# Patient Record
Sex: MALE | Race: WHITE | URBAN - METROPOLITAN AREA
[De-identification: names, ages, dates, MRNs, and addresses within clinical notes are randomized per-mention and may not be internally consistent; named-entity substitution may affect disease eponyms.]

---

## 2018-08-22 ENCOUNTER — APPOINTMENT (RX ONLY)
Dept: URBAN - METROPOLITAN AREA CLINIC 4 | Facility: CLINIC | Age: 83
Setting detail: DERMATOLOGY
End: 2018-08-22

## 2018-08-22 DIAGNOSIS — Z86.006 PERSONAL HISTORY OF MELANOMA IN-SITU: ICD-10-CM

## 2018-08-22 DIAGNOSIS — D22 MELANOCYTIC NEVI: ICD-10-CM

## 2018-08-22 DIAGNOSIS — L82.1 OTHER SEBORRHEIC KERATOSIS: ICD-10-CM

## 2018-08-22 DIAGNOSIS — L81.4 OTHER MELANIN HYPERPIGMENTATION: ICD-10-CM

## 2018-08-22 DIAGNOSIS — L20.89 OTHER ATOPIC DERMATITIS: ICD-10-CM

## 2018-08-22 PROBLEM — L20.84 INTRINSIC (ALLERGIC) ECZEMA: Status: ACTIVE | Noted: 2018-08-22

## 2018-08-22 PROBLEM — D22.5 MELANOCYTIC NEVI OF TRUNK: Status: ACTIVE | Noted: 2018-08-22

## 2018-08-22 PROBLEM — Z85.820 PERSONAL HISTORY OF MALIGNANT MELANOMA OF SKIN: Status: ACTIVE | Noted: 2018-08-22

## 2018-08-22 PROCEDURE — 99213 OFFICE O/P EST LOW 20 MIN: CPT

## 2018-08-22 PROCEDURE — ? COUNSELING

## 2018-08-22 PROCEDURE — ? OBSERVATION

## 2018-08-22 PROCEDURE — ? PRESCRIPTION

## 2018-08-22 RX ORDER — FLUOCINONIDE 0.5 MG/G
CREAM TOPICAL
Qty: 1 | Refills: 3 | Status: CANCELLED
Stop reason: CLARIF

## 2018-08-22 RX ORDER — FLUOCINONIDE 0.5 MG/ML
OINTMENT TOPICAL
Qty: 1 | Refills: 5 | Status: ERX

## 2018-08-22 ASSESSMENT — LOCATION SIMPLE DESCRIPTION DERM
LOCATION SIMPLE: RIGHT UPPER BACK
LOCATION SIMPLE: RIGHT FOREARM
LOCATION SIMPLE: CHEST
LOCATION SIMPLE: LEFT UPPER BACK

## 2018-08-22 ASSESSMENT — LOCATION DETAILED DESCRIPTION DERM
LOCATION DETAILED: LEFT MID-UPPER BACK
LOCATION DETAILED: RIGHT LATERAL SUPERIOR CHEST
LOCATION DETAILED: RIGHT PROXIMAL DORSAL FOREARM
LOCATION DETAILED: LEFT INFERIOR UPPER BACK
LOCATION DETAILED: RIGHT INFERIOR UPPER BACK

## 2018-08-22 ASSESSMENT — LOCATION ZONE DERM
LOCATION ZONE: TRUNK
LOCATION ZONE: ARM

## 2019-03-28 ENCOUNTER — HOSPITAL ENCOUNTER (INPATIENT)
Dept: HOSPITAL 8 - ED | Age: 84
LOS: 6 days | Discharge: HOME | DRG: 640 | End: 2019-04-03
Attending: HOSPITALIST | Admitting: HOSPITALIST
Payer: MEDICARE

## 2019-03-28 VITALS — SYSTOLIC BLOOD PRESSURE: 128 MMHG | DIASTOLIC BLOOD PRESSURE: 86 MMHG

## 2019-03-28 VITALS — BODY MASS INDEX: 31.57 KG/M2 | WEIGHT: 196.43 LBS | HEIGHT: 66 IN

## 2019-03-28 DIAGNOSIS — E87.70: ICD-10-CM

## 2019-03-28 DIAGNOSIS — Z87.891: ICD-10-CM

## 2019-03-28 DIAGNOSIS — Z95.5: ICD-10-CM

## 2019-03-28 DIAGNOSIS — J90: ICD-10-CM

## 2019-03-28 DIAGNOSIS — K21.9: ICD-10-CM

## 2019-03-28 DIAGNOSIS — Z82.49: ICD-10-CM

## 2019-03-28 DIAGNOSIS — M25.78: ICD-10-CM

## 2019-03-28 DIAGNOSIS — E74.39: ICD-10-CM

## 2019-03-28 DIAGNOSIS — I11.9: ICD-10-CM

## 2019-03-28 DIAGNOSIS — E78.5: ICD-10-CM

## 2019-03-28 DIAGNOSIS — H40.9: ICD-10-CM

## 2019-03-28 DIAGNOSIS — J18.9: ICD-10-CM

## 2019-03-28 DIAGNOSIS — I08.0: ICD-10-CM

## 2019-03-28 DIAGNOSIS — E55.9: ICD-10-CM

## 2019-03-28 DIAGNOSIS — I25.10: ICD-10-CM

## 2019-03-28 DIAGNOSIS — H35.30: ICD-10-CM

## 2019-03-28 DIAGNOSIS — E87.1: Primary | ICD-10-CM

## 2019-03-28 DIAGNOSIS — N40.0: ICD-10-CM

## 2019-03-28 LAB
ALBUMIN SERPL-MCNC: 3.4 G/DL (ref 3.4–5)
ALP SERPL-CCNC: 73 U/L (ref 45–117)
ALT SERPL-CCNC: 33 U/L (ref 12–78)
ANION GAP SERPL CALC-SCNC: 9 MMOL/L (ref 5–15)
BASOPHILS # BLD AUTO: 0 X10^3/UL (ref 0–0.1)
BASOPHILS NFR BLD AUTO: 0 % (ref 0–1)
BILIRUB SERPL-MCNC: 0.6 MG/DL (ref 0.2–1)
CALCIUM SERPL-MCNC: 8.7 MG/DL (ref 8.5–10.1)
CHLORIDE SERPL-SCNC: 94 MMOL/L (ref 98–107)
CREAT SERPL-MCNC: 0.94 MG/DL (ref 0.7–1.3)
CULTURE INDICATED?: NO
EOSINOPHIL # BLD AUTO: 0.03 X10^3/UL (ref 0–0.4)
EOSINOPHIL NFR BLD AUTO: 0 % (ref 1–7)
ERYTHROCYTE [DISTWIDTH] IN BLOOD BY AUTOMATED COUNT: 12.8 % (ref 9.4–14.8)
LYMPHOCYTES # BLD AUTO: 1.08 X10^3/UL (ref 1–3.4)
LYMPHOCYTES NFR BLD AUTO: 13 % (ref 22–44)
MCH RBC QN AUTO: 32.9 PG (ref 27.5–34.5)
MCHC RBC AUTO-ENTMCNC: 34.1 G/DL (ref 33.2–36.2)
MCV RBC AUTO: 96.5 FL (ref 81–97)
MD: NO
MICROSCOPIC: (no result)
MONOCYTES # BLD AUTO: 0.62 X10^3/UL (ref 0.2–0.8)
MONOCYTES NFR BLD AUTO: 7 % (ref 2–9)
NEUTROPHILS # BLD AUTO: 6.6 X10^3/UL (ref 1.8–6.8)
NEUTROPHILS NFR BLD AUTO: 79 % (ref 42–75)
PLATELET # BLD AUTO: 138 X10^3/UL (ref 130–400)
PMV BLD AUTO: 9.4 FL (ref 7.4–10.4)
PROT SERPL-MCNC: 6.7 G/DL (ref 6.4–8.2)
RBC # BLD AUTO: 3.57 X10^6/UL (ref 4.38–5.82)
TSH SERPL-ACNC: 7.23 MIU/L (ref 0.36–3.74)

## 2019-03-28 PROCEDURE — 82040 ASSAY OF SERUM ALBUMIN: CPT

## 2019-03-28 PROCEDURE — 99285 EMERGENCY DEPT VISIT HI MDM: CPT

## 2019-03-28 PROCEDURE — 83935 ASSAY OF URINE OSMOLALITY: CPT

## 2019-03-28 PROCEDURE — 82436 ASSAY OF URINE CHLORIDE: CPT

## 2019-03-28 PROCEDURE — 84145 PROCALCITONIN (PCT): CPT

## 2019-03-28 PROCEDURE — 83735 ASSAY OF MAGNESIUM: CPT

## 2019-03-28 PROCEDURE — 85025 COMPLETE CBC W/AUTO DIFF WBC: CPT

## 2019-03-28 PROCEDURE — 83930 ASSAY OF BLOOD OSMOLALITY: CPT

## 2019-03-28 PROCEDURE — 96365 THER/PROPH/DIAG IV INF INIT: CPT

## 2019-03-28 PROCEDURE — 80053 COMPREHEN METABOLIC PANEL: CPT

## 2019-03-28 PROCEDURE — 84133 ASSAY OF URINE POTASSIUM: CPT

## 2019-03-28 PROCEDURE — 71046 X-RAY EXAM CHEST 2 VIEWS: CPT

## 2019-03-28 PROCEDURE — 81003 URINALYSIS AUTO W/O SCOPE: CPT

## 2019-03-28 PROCEDURE — 93306 TTE W/DOPPLER COMPLETE: CPT

## 2019-03-28 PROCEDURE — 96375 TX/PRO/DX INJ NEW DRUG ADDON: CPT

## 2019-03-28 PROCEDURE — 80048 BASIC METABOLIC PNL TOTAL CA: CPT

## 2019-03-28 PROCEDURE — 83880 ASSAY OF NATRIURETIC PEPTIDE: CPT

## 2019-03-28 PROCEDURE — 71250 CT THORAX DX C-: CPT

## 2019-03-28 PROCEDURE — 87205 SMEAR GRAM STAIN: CPT

## 2019-03-28 PROCEDURE — 87040 BLOOD CULTURE FOR BACTERIA: CPT

## 2019-03-28 PROCEDURE — 83605 ASSAY OF LACTIC ACID: CPT

## 2019-03-28 PROCEDURE — 36415 COLL VENOUS BLD VENIPUNCTURE: CPT

## 2019-03-28 PROCEDURE — 94640 AIRWAY INHALATION TREATMENT: CPT

## 2019-03-28 PROCEDURE — 93005 ELECTROCARDIOGRAM TRACING: CPT

## 2019-03-28 PROCEDURE — 87070 CULTURE OTHR SPECIMN AEROBIC: CPT

## 2019-03-28 PROCEDURE — 84300 ASSAY OF URINE SODIUM: CPT

## 2019-03-28 PROCEDURE — 84443 ASSAY THYROID STIM HORMONE: CPT

## 2019-03-28 RX ADMIN — LISINOPRIL SCH MG: 20 TABLET ORAL at 20:31

## 2019-03-28 RX ADMIN — LATANOPROST SCH DROP: 50 SOLUTION OPHTHALMIC at 21:51

## 2019-03-28 RX ADMIN — SIMVASTATIN SCH MG: 40 TABLET, FILM COATED ORAL at 20:31

## 2019-03-28 RX ADMIN — GUAIFENESIN SCH MG: 200 TABLET ORAL at 20:31

## 2019-03-28 RX ADMIN — CEFTRIAXONE SCH MLS/HR: 1 INJECTION, SOLUTION INTRAVENOUS at 16:30

## 2019-03-28 RX ADMIN — DOXYCYCLINE SCH MLS/HR: 100 INJECTION, POWDER, LYOPHILIZED, FOR SOLUTION INTRAVENOUS at 18:31

## 2019-03-28 RX ADMIN — SODIUM CHLORIDE SCH MLS/HR: 0.9 INJECTION, SOLUTION INTRAVENOUS at 17:21

## 2019-03-28 RX ADMIN — TIMOLOL MALEATE SCH DROP: 5 SOLUTION OPHTHALMIC at 21:00

## 2019-03-28 RX ADMIN — ENOXAPARIN SODIUM SCH MG: 40 INJECTION SUBCUTANEOUS at 17:21

## 2019-03-28 RX ADMIN — GUAIFENESIN SCH MG: 200 TABLET ORAL at 17:20

## 2019-03-28 RX ADMIN — ASPIRIN SCH MG: 81 TABLET, COATED ORAL at 20:31

## 2019-03-28 RX ADMIN — Medication SCH SPRAY: at 21:52

## 2019-03-28 RX ADMIN — DOXAZOSIN SCH MG: 2 TABLET ORAL at 20:31

## 2019-03-29 VITALS — DIASTOLIC BLOOD PRESSURE: 80 MMHG | SYSTOLIC BLOOD PRESSURE: 177 MMHG

## 2019-03-29 VITALS — SYSTOLIC BLOOD PRESSURE: 181 MMHG | DIASTOLIC BLOOD PRESSURE: 80 MMHG

## 2019-03-29 VITALS — DIASTOLIC BLOOD PRESSURE: 99 MMHG | SYSTOLIC BLOOD PRESSURE: 193 MMHG

## 2019-03-29 VITALS — DIASTOLIC BLOOD PRESSURE: 84 MMHG | SYSTOLIC BLOOD PRESSURE: 179 MMHG

## 2019-03-29 LAB
ANION GAP SERPL CALC-SCNC: 9 MMOL/L (ref 5–15)
BASOPHILS # BLD AUTO: 0.01 X10^3/UL (ref 0–0.1)
BASOPHILS NFR BLD AUTO: 0 % (ref 0–1)
CALCIUM SERPL-MCNC: 8.3 MG/DL (ref 8.5–10.1)
CHLORIDE SERPL-SCNC: 93 MMOL/L (ref 98–107)
CREAT SERPL-MCNC: 0.86 MG/DL (ref 0.7–1.3)
EOSINOPHIL # BLD AUTO: 0.02 X10^3/UL (ref 0–0.4)
EOSINOPHIL NFR BLD AUTO: 0 % (ref 1–7)
ERYTHROCYTE [DISTWIDTH] IN BLOOD BY AUTOMATED COUNT: 12.6 % (ref 9.4–14.8)
LYMPHOCYTES # BLD AUTO: 0.97 X10^3/UL (ref 1–3.4)
LYMPHOCYTES NFR BLD AUTO: 14 % (ref 22–44)
MCH RBC QN AUTO: 33.2 PG (ref 27.5–34.5)
MCHC RBC AUTO-ENTMCNC: 34.5 G/DL (ref 33.2–36.2)
MCV RBC AUTO: 96.2 FL (ref 81–97)
MD: NO
MONOCYTES # BLD AUTO: 0.56 X10^3/UL (ref 0.2–0.8)
MONOCYTES NFR BLD AUTO: 8 % (ref 2–9)
NEUTROPHILS # BLD AUTO: 5.59 X10^3/UL (ref 1.8–6.8)
NEUTROPHILS NFR BLD AUTO: 78 % (ref 42–75)
PLATELET # BLD AUTO: 132 X10^3/UL (ref 130–400)
PMV BLD AUTO: 10.8 FL (ref 7.4–10.4)
RBC # BLD AUTO: 3.26 X10^6/UL (ref 4.38–5.82)

## 2019-03-29 RX ADMIN — OMEPRAZOLE SCH MG: 20 CAPSULE, DELAYED RELEASE ORAL at 08:16

## 2019-03-29 RX ADMIN — SIMVASTATIN SCH MG: 40 TABLET, FILM COATED ORAL at 21:16

## 2019-03-29 RX ADMIN — TIMOLOL MALEATE SCH DROP: 5 SOLUTION OPHTHALMIC at 09:00

## 2019-03-29 RX ADMIN — DOXAZOSIN SCH MG: 2 TABLET ORAL at 21:15

## 2019-03-29 RX ADMIN — DOXYCYCLINE SCH MLS/HR: 100 INJECTION, POWDER, LYOPHILIZED, FOR SOLUTION INTRAVENOUS at 05:53

## 2019-03-29 RX ADMIN — LATANOPROST SCH DROP: 50 SOLUTION OPHTHALMIC at 21:17

## 2019-03-29 RX ADMIN — DOXYCYCLINE SCH MLS/HR: 100 INJECTION, POWDER, LYOPHILIZED, FOR SOLUTION INTRAVENOUS at 19:08

## 2019-03-29 RX ADMIN — SODIUM CHLORIDE SCH MLS/HR: 0.9 INJECTION, SOLUTION INTRAVENOUS at 08:22

## 2019-03-29 RX ADMIN — GUAIFENESIN SCH MG: 200 TABLET ORAL at 05:53

## 2019-03-29 RX ADMIN — TIMOLOL MALEATE SCH DROP: 5 SOLUTION OPHTHALMIC at 21:00

## 2019-03-29 RX ADMIN — DOXAZOSIN MESYLATE SCH MG: 1 TABLET ORAL at 08:16

## 2019-03-29 RX ADMIN — Medication SCH CAP: at 08:16

## 2019-03-29 RX ADMIN — DILTIAZEM HYDROCHLORIDE SCH MG: 120 CAPSULE, EXTENDED RELEASE ORAL at 08:16

## 2019-03-29 RX ADMIN — Medication SCH TAB: at 08:16

## 2019-03-29 RX ADMIN — ASPIRIN SCH MG: 81 TABLET, COATED ORAL at 21:17

## 2019-03-29 RX ADMIN — LISINOPRIL SCH MG: 20 TABLET ORAL at 21:16

## 2019-03-29 RX ADMIN — Medication SCH SPRAY: at 21:17

## 2019-03-29 RX ADMIN — ENOXAPARIN SODIUM SCH MG: 40 INJECTION SUBCUTANEOUS at 17:16

## 2019-03-29 RX ADMIN — Medication SCH SPRAY: at 08:16

## 2019-03-29 RX ADMIN — CEFTRIAXONE SCH MLS/HR: 1 INJECTION, SOLUTION INTRAVENOUS at 18:10

## 2019-03-29 RX ADMIN — GUAIFENESIN SCH MG: 200 TABLET ORAL at 11:53

## 2019-03-29 RX ADMIN — GUAIFENESIN SCH MG: 600 TABLET, EXTENDED RELEASE ORAL at 21:16

## 2019-03-29 RX ADMIN — METOPROLOL SUCCINATE SCH MG: 50 TABLET, FILM COATED, EXTENDED RELEASE ORAL at 08:16

## 2019-03-30 VITALS — SYSTOLIC BLOOD PRESSURE: 130 MMHG | DIASTOLIC BLOOD PRESSURE: 75 MMHG

## 2019-03-30 VITALS — SYSTOLIC BLOOD PRESSURE: 138 MMHG | DIASTOLIC BLOOD PRESSURE: 88 MMHG

## 2019-03-30 VITALS — SYSTOLIC BLOOD PRESSURE: 143 MMHG | DIASTOLIC BLOOD PRESSURE: 90 MMHG

## 2019-03-30 VITALS — SYSTOLIC BLOOD PRESSURE: 169 MMHG | DIASTOLIC BLOOD PRESSURE: 89 MMHG

## 2019-03-30 LAB
ANION GAP SERPL CALC-SCNC: 11 MMOL/L (ref 5–15)
BASOPHILS # BLD AUTO: 0.02 X10^3/UL (ref 0–0.1)
BASOPHILS NFR BLD AUTO: 0 % (ref 0–1)
CALCIUM SERPL-MCNC: 8.3 MG/DL (ref 8.5–10.1)
CHLORIDE SERPL-SCNC: 86 MMOL/L (ref 98–107)
CHLORIDE,URINE RANDOM: 120 MMOL/L
CREAT SERPL-MCNC: 0.84 MG/DL (ref 0.7–1.3)
EOSINOPHIL # BLD AUTO: 0 X10^3/UL (ref 0–0.4)
EOSINOPHIL NFR BLD AUTO: 0 % (ref 1–7)
ERYTHROCYTE [DISTWIDTH] IN BLOOD BY AUTOMATED COUNT: 12.7 % (ref 9.4–14.8)
LYMPHOCYTES # BLD AUTO: 1.01 X10^3/UL (ref 1–3.4)
LYMPHOCYTES NFR BLD AUTO: 10 % (ref 22–44)
MCH RBC QN AUTO: 32.3 PG (ref 27.5–34.5)
MCHC RBC AUTO-ENTMCNC: 34 G/DL (ref 33.2–36.2)
MCV RBC AUTO: 95.1 FL (ref 81–97)
MD: NO
MONOCYTES # BLD AUTO: 0.68 X10^3/UL (ref 0.2–0.8)
MONOCYTES NFR BLD AUTO: 7 % (ref 2–9)
NEUTROPHILS # BLD AUTO: 7.98 X10^3/UL (ref 1.8–6.8)
NEUTROPHILS NFR BLD AUTO: 82 % (ref 42–75)
OSMOLALITY,URINE: 356 MOSM/KG (ref 500–850)
PLATELET # BLD AUTO: 159 X10^3/UL (ref 130–400)
PMV BLD AUTO: 9 FL (ref 7.4–10.4)
POTASSIUM UR-SCNC: 53 MMOL/L
RBC # BLD AUTO: 3.45 X10^6/UL (ref 4.38–5.82)
SODIUM,URINE RANDOM: 59 MMOL/L

## 2019-03-30 RX ADMIN — ENOXAPARIN SODIUM SCH MG: 40 INJECTION SUBCUTANEOUS at 17:04

## 2019-03-30 RX ADMIN — SIMVASTATIN SCH MG: 40 TABLET, FILM COATED ORAL at 20:47

## 2019-03-30 RX ADMIN — Medication SCH CAP: at 10:00

## 2019-03-30 RX ADMIN — DOXYCYCLINE SCH MLS/HR: 100 INJECTION, POWDER, LYOPHILIZED, FOR SOLUTION INTRAVENOUS at 06:12

## 2019-03-30 RX ADMIN — Medication SCH SPRAY: at 10:01

## 2019-03-30 RX ADMIN — SODIUM CHLORIDE SCH MLS/HR: 0.9 INJECTION, SOLUTION INTRAVENOUS at 11:20

## 2019-03-30 RX ADMIN — Medication SCH TAB: at 10:00

## 2019-03-30 RX ADMIN — TIMOLOL MALEATE SCH DROP: 5 SOLUTION OPHTHALMIC at 19:11

## 2019-03-30 RX ADMIN — TIMOLOL MALEATE SCH DROP: 5 SOLUTION OPHTHALMIC at 09:00

## 2019-03-30 RX ADMIN — Medication SCH SPRAY: at 20:46

## 2019-03-30 RX ADMIN — GUAIFENESIN SCH MG: 600 TABLET, EXTENDED RELEASE ORAL at 20:47

## 2019-03-30 RX ADMIN — DOXAZOSIN SCH MG: 2 TABLET ORAL at 20:47

## 2019-03-30 RX ADMIN — LATANOPROST SCH DROP: 50 SOLUTION OPHTHALMIC at 19:11

## 2019-03-30 RX ADMIN — ASPIRIN SCH MG: 81 TABLET, COATED ORAL at 20:47

## 2019-03-30 RX ADMIN — SODIUM CHLORIDE SCH MLS/HR: 0.9 INJECTION, SOLUTION INTRAVENOUS at 20:46

## 2019-03-30 RX ADMIN — DOXAZOSIN MESYLATE SCH MG: 1 TABLET ORAL at 10:00

## 2019-03-30 RX ADMIN — METOPROLOL SUCCINATE SCH MG: 50 TABLET, FILM COATED, EXTENDED RELEASE ORAL at 10:00

## 2019-03-30 RX ADMIN — DOXYCYCLINE SCH MLS/HR: 100 INJECTION, POWDER, LYOPHILIZED, FOR SOLUTION INTRAVENOUS at 17:37

## 2019-03-30 RX ADMIN — OMEPRAZOLE SCH MG: 20 CAPSULE, DELAYED RELEASE ORAL at 10:00

## 2019-03-30 RX ADMIN — GUAIFENESIN SCH MG: 600 TABLET, EXTENDED RELEASE ORAL at 10:01

## 2019-03-30 RX ADMIN — DILTIAZEM HYDROCHLORIDE SCH MG: 120 CAPSULE, EXTENDED RELEASE ORAL at 10:01

## 2019-03-30 RX ADMIN — DIPHENHYDRAMINE HYDROCHLORIDE PRN MG: 25 CAPSULE ORAL at 00:35

## 2019-03-30 RX ADMIN — CEFTRIAXONE SCH MLS/HR: 1 INJECTION, SOLUTION INTRAVENOUS at 17:04

## 2019-03-30 RX ADMIN — LISINOPRIL SCH MG: 20 TABLET ORAL at 20:47

## 2019-03-31 VITALS — DIASTOLIC BLOOD PRESSURE: 77 MMHG | SYSTOLIC BLOOD PRESSURE: 131 MMHG

## 2019-03-31 VITALS — SYSTOLIC BLOOD PRESSURE: 122 MMHG | DIASTOLIC BLOOD PRESSURE: 78 MMHG

## 2019-03-31 VITALS — DIASTOLIC BLOOD PRESSURE: 73 MMHG | SYSTOLIC BLOOD PRESSURE: 146 MMHG

## 2019-03-31 VITALS — SYSTOLIC BLOOD PRESSURE: 148 MMHG | DIASTOLIC BLOOD PRESSURE: 64 MMHG

## 2019-03-31 LAB
ALBUMIN SERPL-MCNC: 2.7 G/DL (ref 3.4–5)
ANION GAP SERPL CALC-SCNC: 8 MMOL/L (ref 5–15)
CALCIUM SERPL-MCNC: 7.8 MG/DL (ref 8.5–10.1)
CHLORIDE SERPL-SCNC: 85 MMOL/L (ref 98–107)
CREAT SERPL-MCNC: 0.75 MG/DL (ref 0.7–1.3)

## 2019-03-31 RX ADMIN — METOPROLOL SUCCINATE SCH MG: 50 TABLET, FILM COATED, EXTENDED RELEASE ORAL at 09:00

## 2019-03-31 RX ADMIN — Medication SCH SPRAY: at 20:30

## 2019-03-31 RX ADMIN — GUAIFENESIN SCH MG: 600 TABLET, EXTENDED RELEASE ORAL at 20:30

## 2019-03-31 RX ADMIN — OMEPRAZOLE SCH MG: 20 CAPSULE, DELAYED RELEASE ORAL at 09:00

## 2019-03-31 RX ADMIN — ENOXAPARIN SODIUM SCH MG: 40 INJECTION SUBCUTANEOUS at 16:42

## 2019-03-31 RX ADMIN — Medication SCH TAB: at 09:00

## 2019-03-31 RX ADMIN — ASPIRIN SCH MG: 81 TABLET, COATED ORAL at 20:31

## 2019-03-31 RX ADMIN — GUAIFENESIN SCH MG: 600 TABLET, EXTENDED RELEASE ORAL at 09:00

## 2019-03-31 RX ADMIN — TIMOLOL MALEATE SCH DROP: 5 SOLUTION OPHTHALMIC at 09:00

## 2019-03-31 RX ADMIN — TIMOLOL MALEATE SCH DROP: 5 SOLUTION OPHTHALMIC at 19:58

## 2019-03-31 RX ADMIN — SIMVASTATIN SCH MG: 40 TABLET, FILM COATED ORAL at 20:30

## 2019-03-31 RX ADMIN — POTASSIUM CHLORIDE SCH MEQ: 20 TABLET, EXTENDED RELEASE ORAL at 16:42

## 2019-03-31 RX ADMIN — Medication SCH CAP: at 09:00

## 2019-03-31 RX ADMIN — DOXYCYCLINE SCH MLS/HR: 100 INJECTION, POWDER, LYOPHILIZED, FOR SOLUTION INTRAVENOUS at 06:07

## 2019-03-31 RX ADMIN — Medication SCH SPRAY: at 09:00

## 2019-03-31 RX ADMIN — DOXAZOSIN SCH MG: 2 TABLET ORAL at 20:30

## 2019-03-31 RX ADMIN — LISINOPRIL SCH MG: 20 TABLET ORAL at 20:30

## 2019-03-31 RX ADMIN — DOXAZOSIN MESYLATE SCH MG: 1 TABLET ORAL at 09:00

## 2019-03-31 RX ADMIN — LATANOPROST SCH DROP: 50 SOLUTION OPHTHALMIC at 20:29

## 2019-03-31 RX ADMIN — DILTIAZEM HYDROCHLORIDE SCH MG: 120 CAPSULE, EXTENDED RELEASE ORAL at 09:00

## 2019-03-31 RX ADMIN — CEFTRIAXONE SCH MLS/HR: 1 INJECTION, SOLUTION INTRAVENOUS at 16:42

## 2019-03-31 RX ADMIN — SODIUM CHLORIDE SCH MLS/HR: 0.9 INJECTION, SOLUTION INTRAVENOUS at 08:00

## 2019-04-01 VITALS — DIASTOLIC BLOOD PRESSURE: 71 MMHG | SYSTOLIC BLOOD PRESSURE: 128 MMHG

## 2019-04-01 VITALS — DIASTOLIC BLOOD PRESSURE: 77 MMHG | SYSTOLIC BLOOD PRESSURE: 159 MMHG

## 2019-04-01 VITALS — DIASTOLIC BLOOD PRESSURE: 85 MMHG | SYSTOLIC BLOOD PRESSURE: 165 MMHG

## 2019-04-01 VITALS — DIASTOLIC BLOOD PRESSURE: 58 MMHG | SYSTOLIC BLOOD PRESSURE: 136 MMHG

## 2019-04-01 LAB
ANION GAP SERPL CALC-SCNC: 10 MMOL/L (ref 5–15)
ANION GAP SERPL CALC-SCNC: 8 MMOL/L (ref 5–15)
CALCIUM SERPL-MCNC: 8 MG/DL (ref 8.5–10.1)
CALCIUM SERPL-MCNC: 8.2 MG/DL (ref 8.5–10.1)
CHLORIDE SERPL-SCNC: 86 MMOL/L (ref 98–107)
CHLORIDE SERPL-SCNC: 86 MMOL/L (ref 98–107)
CREAT SERPL-MCNC: 0.73 MG/DL (ref 0.7–1.3)
CREAT SERPL-MCNC: 0.87 MG/DL (ref 0.7–1.3)

## 2019-04-01 RX ADMIN — CEFTRIAXONE SCH MLS/HR: 1 INJECTION, SOLUTION INTRAVENOUS at 16:50

## 2019-04-01 RX ADMIN — LATANOPROST SCH DROP: 50 SOLUTION OPHTHALMIC at 21:35

## 2019-04-01 RX ADMIN — LISINOPRIL SCH MG: 20 TABLET ORAL at 21:36

## 2019-04-01 RX ADMIN — Medication SCH SPRAY: at 08:50

## 2019-04-01 RX ADMIN — Medication SCH TAB: at 08:51

## 2019-04-01 RX ADMIN — ASPIRIN SCH MG: 81 TABLET, COATED ORAL at 21:35

## 2019-04-01 RX ADMIN — GUAIFENESIN SCH MG: 600 TABLET, EXTENDED RELEASE ORAL at 08:48

## 2019-04-01 RX ADMIN — DILTIAZEM HYDROCHLORIDE SCH MG: 120 CAPSULE, EXTENDED RELEASE ORAL at 08:50

## 2019-04-01 RX ADMIN — DIPHENHYDRAMINE HYDROCHLORIDE PRN MG: 25 CAPSULE ORAL at 01:11

## 2019-04-01 RX ADMIN — ENOXAPARIN SODIUM SCH MG: 40 INJECTION SUBCUTANEOUS at 16:50

## 2019-04-01 RX ADMIN — TIMOLOL MALEATE SCH DROP: 5 SOLUTION OPHTHALMIC at 19:43

## 2019-04-01 RX ADMIN — FUROSEMIDE SCH MG: 10 INJECTION, SOLUTION INTRAMUSCULAR; INTRAVENOUS at 11:24

## 2019-04-01 RX ADMIN — Medication SCH CAP: at 08:48

## 2019-04-01 RX ADMIN — TIMOLOL MALEATE SCH DROP: 5 SOLUTION OPHTHALMIC at 08:52

## 2019-04-01 RX ADMIN — POTASSIUM CHLORIDE SCH MEQ: 20 TABLET, EXTENDED RELEASE ORAL at 16:50

## 2019-04-01 RX ADMIN — OMEPRAZOLE SCH MG: 20 CAPSULE, DELAYED RELEASE ORAL at 08:48

## 2019-04-01 RX ADMIN — METOPROLOL SUCCINATE SCH MG: 50 TABLET, FILM COATED, EXTENDED RELEASE ORAL at 08:51

## 2019-04-01 RX ADMIN — SIMVASTATIN SCH MG: 40 TABLET, FILM COATED ORAL at 21:36

## 2019-04-01 RX ADMIN — DOXAZOSIN MESYLATE SCH MG: 1 TABLET ORAL at 08:45

## 2019-04-01 RX ADMIN — Medication SCH SPRAY: at 21:35

## 2019-04-01 RX ADMIN — GUAIFENESIN SCH MG: 600 TABLET, EXTENDED RELEASE ORAL at 21:36

## 2019-04-01 RX ADMIN — DOXAZOSIN SCH MG: 2 TABLET ORAL at 21:36

## 2019-04-01 RX ADMIN — POTASSIUM CHLORIDE SCH MEQ: 20 TABLET, EXTENDED RELEASE ORAL at 08:49

## 2019-04-02 VITALS — DIASTOLIC BLOOD PRESSURE: 85 MMHG | SYSTOLIC BLOOD PRESSURE: 165 MMHG

## 2019-04-02 VITALS — DIASTOLIC BLOOD PRESSURE: 63 MMHG | SYSTOLIC BLOOD PRESSURE: 156 MMHG

## 2019-04-02 VITALS — SYSTOLIC BLOOD PRESSURE: 127 MMHG | DIASTOLIC BLOOD PRESSURE: 63 MMHG

## 2019-04-02 VITALS — SYSTOLIC BLOOD PRESSURE: 162 MMHG | DIASTOLIC BLOOD PRESSURE: 81 MMHG

## 2019-04-02 VITALS — DIASTOLIC BLOOD PRESSURE: 80 MMHG | SYSTOLIC BLOOD PRESSURE: 189 MMHG

## 2019-04-02 LAB
ALBUMIN SERPL-MCNC: 3.1 G/DL (ref 3.4–5)
ANION GAP SERPL CALC-SCNC: 11 MMOL/L (ref 5–15)
CALCIUM SERPL-MCNC: 8.6 MG/DL (ref 8.5–10.1)
CHLORIDE SERPL-SCNC: 87 MMOL/L (ref 98–107)
CREAT SERPL-MCNC: 0.73 MG/DL (ref 0.7–1.3)

## 2019-04-02 RX ADMIN — Medication SCH SPRAY: at 20:06

## 2019-04-02 RX ADMIN — Medication SCH SPRAY: at 08:21

## 2019-04-02 RX ADMIN — TIMOLOL MALEATE SCH DROP: 5 SOLUTION OPHTHALMIC at 08:20

## 2019-04-02 RX ADMIN — OMEPRAZOLE SCH MG: 20 CAPSULE, DELAYED RELEASE ORAL at 08:18

## 2019-04-02 RX ADMIN — TIMOLOL MALEATE SCH DROP: 5 SOLUTION OPHTHALMIC at 20:07

## 2019-04-02 RX ADMIN — SIMVASTATIN SCH MG: 40 TABLET, FILM COATED ORAL at 20:06

## 2019-04-02 RX ADMIN — ENOXAPARIN SODIUM SCH MG: 40 INJECTION SUBCUTANEOUS at 17:52

## 2019-04-02 RX ADMIN — LISINOPRIL SCH MG: 20 TABLET ORAL at 20:06

## 2019-04-02 RX ADMIN — DOXAZOSIN MESYLATE SCH MG: 1 TABLET ORAL at 08:19

## 2019-04-02 RX ADMIN — DOXAZOSIN SCH MG: 2 TABLET ORAL at 20:07

## 2019-04-02 RX ADMIN — METOPROLOL SUCCINATE SCH MG: 50 TABLET, FILM COATED, EXTENDED RELEASE ORAL at 08:19

## 2019-04-02 RX ADMIN — LATANOPROST SCH DROP: 50 SOLUTION OPHTHALMIC at 20:06

## 2019-04-02 RX ADMIN — Medication SCH CAP: at 08:18

## 2019-04-02 RX ADMIN — GUAIFENESIN SCH MG: 600 TABLET, EXTENDED RELEASE ORAL at 20:07

## 2019-04-02 RX ADMIN — POTASSIUM CHLORIDE SCH MEQ: 20 TABLET, EXTENDED RELEASE ORAL at 17:52

## 2019-04-02 RX ADMIN — CEFTRIAXONE SCH MLS/HR: 1 INJECTION, SOLUTION INTRAVENOUS at 16:21

## 2019-04-02 RX ADMIN — ASPIRIN SCH MG: 81 TABLET, COATED ORAL at 20:07

## 2019-04-02 RX ADMIN — DILTIAZEM HYDROCHLORIDE SCH MG: 120 CAPSULE, EXTENDED RELEASE ORAL at 08:18

## 2019-04-02 RX ADMIN — GUAIFENESIN SCH MG: 600 TABLET, EXTENDED RELEASE ORAL at 08:32

## 2019-04-02 RX ADMIN — FUROSEMIDE SCH MG: 10 INJECTION, SOLUTION INTRAMUSCULAR; INTRAVENOUS at 08:20

## 2019-04-02 RX ADMIN — Medication SCH TAB: at 08:18

## 2019-04-02 RX ADMIN — POTASSIUM CHLORIDE SCH MEQ: 20 TABLET, EXTENDED RELEASE ORAL at 08:19

## 2019-04-03 VITALS — DIASTOLIC BLOOD PRESSURE: 66 MMHG | SYSTOLIC BLOOD PRESSURE: 152 MMHG

## 2019-04-03 VITALS — SYSTOLIC BLOOD PRESSURE: 147 MMHG | DIASTOLIC BLOOD PRESSURE: 68 MMHG

## 2019-04-03 LAB
ANION GAP SERPL CALC-SCNC: 10 MMOL/L (ref 5–15)
BASOPHILS # BLD AUTO: 0.03 X10^3/UL (ref 0–0.1)
BASOPHILS NFR BLD AUTO: 0 % (ref 0–1)
CALCIUM SERPL-MCNC: 8.3 MG/DL (ref 8.5–10.1)
CHLORIDE SERPL-SCNC: 91 MMOL/L (ref 98–107)
CREAT SERPL-MCNC: 0.8 MG/DL (ref 0.7–1.3)
EOSINOPHIL # BLD AUTO: 0.03 X10^3/UL (ref 0–0.4)
EOSINOPHIL NFR BLD AUTO: 1 % (ref 1–7)
ERYTHROCYTE [DISTWIDTH] IN BLOOD BY AUTOMATED COUNT: 12.3 % (ref 9.4–14.8)
LYMPHOCYTES # BLD AUTO: 1.17 X10^3/UL (ref 1–3.4)
LYMPHOCYTES NFR BLD AUTO: 18 % (ref 22–44)
MCH RBC QN AUTO: 31.9 PG (ref 27.5–34.5)
MCHC RBC AUTO-ENTMCNC: 33.2 G/DL (ref 33.2–36.2)
MCV RBC AUTO: 96 FL (ref 81–97)
MD: NO
MONOCYTES # BLD AUTO: 0.6 X10^3/UL (ref 0.2–0.8)
MONOCYTES NFR BLD AUTO: 9 % (ref 2–9)
NEUTROPHILS # BLD AUTO: 4.62 X10^3/UL (ref 1.8–6.8)
NEUTROPHILS NFR BLD AUTO: 72 % (ref 42–75)
PLATELET # BLD AUTO: 199 X10^3/UL (ref 130–400)
PMV BLD AUTO: 8 FL (ref 7.4–10.4)
RBC # BLD AUTO: 3.44 X10^6/UL (ref 4.38–5.82)

## 2019-04-03 RX ADMIN — METOPROLOL SUCCINATE SCH MG: 50 TABLET, FILM COATED, EXTENDED RELEASE ORAL at 09:18

## 2019-04-03 RX ADMIN — FUROSEMIDE SCH MG: 10 INJECTION, SOLUTION INTRAMUSCULAR; INTRAVENOUS at 09:17

## 2019-04-03 RX ADMIN — Medication SCH TAB: at 09:19

## 2019-04-03 RX ADMIN — DILTIAZEM HYDROCHLORIDE SCH MG: 120 CAPSULE, EXTENDED RELEASE ORAL at 09:18

## 2019-04-03 RX ADMIN — POTASSIUM CHLORIDE SCH MEQ: 20 TABLET, EXTENDED RELEASE ORAL at 09:17

## 2019-04-03 RX ADMIN — GUAIFENESIN SCH MG: 600 TABLET, EXTENDED RELEASE ORAL at 09:17

## 2019-04-03 RX ADMIN — TIMOLOL MALEATE SCH DROP: 5 SOLUTION OPHTHALMIC at 09:19

## 2019-04-03 RX ADMIN — Medication SCH SPRAY: at 09:19

## 2019-04-03 RX ADMIN — DOXAZOSIN MESYLATE SCH MG: 1 TABLET ORAL at 09:18

## 2019-04-03 RX ADMIN — Medication SCH CAP: at 09:17

## 2019-04-03 RX ADMIN — OMEPRAZOLE SCH MG: 20 CAPSULE, DELAYED RELEASE ORAL at 09:18

## 2019-12-18 ENCOUNTER — HOSPITAL ENCOUNTER (OUTPATIENT)
Dept: HOSPITAL 8 - CFH | Age: 84
Discharge: HOME | End: 2019-12-18
Attending: INTERNAL MEDICINE
Payer: MEDICARE

## 2019-12-18 DIAGNOSIS — I48.91: ICD-10-CM

## 2019-12-18 DIAGNOSIS — J90: ICD-10-CM

## 2019-12-18 DIAGNOSIS — I10: ICD-10-CM

## 2019-12-18 DIAGNOSIS — I25.10: ICD-10-CM

## 2019-12-18 DIAGNOSIS — D68.9: ICD-10-CM

## 2019-12-18 DIAGNOSIS — J18.8: Primary | ICD-10-CM

## 2019-12-18 DIAGNOSIS — E87.1: ICD-10-CM

## 2019-12-18 PROCEDURE — 71046 X-RAY EXAM CHEST 2 VIEWS: CPT

## 2020-12-10 ENCOUNTER — HOSPITAL ENCOUNTER (OUTPATIENT)
Dept: HOSPITAL 8 - CFH | Age: 85
Discharge: HOME | End: 2020-12-10
Attending: INTERNAL MEDICINE
Payer: MEDICARE

## 2020-12-10 DIAGNOSIS — I08.3: Primary | ICD-10-CM

## 2020-12-10 DIAGNOSIS — I11.9: ICD-10-CM

## 2020-12-10 PROCEDURE — 93306 TTE W/DOPPLER COMPLETE: CPT

## 2021-01-14 DIAGNOSIS — Z23 NEED FOR VACCINATION: ICD-10-CM

## 2021-06-02 ENCOUNTER — APPOINTMENT (RX ONLY)
Dept: URBAN - METROPOLITAN AREA CLINIC 4 | Facility: CLINIC | Age: 86
Setting detail: DERMATOLOGY
End: 2021-06-02

## 2021-06-02 DIAGNOSIS — L81.4 OTHER MELANIN HYPERPIGMENTATION: ICD-10-CM

## 2021-06-02 DIAGNOSIS — L82.1 OTHER SEBORRHEIC KERATOSIS: ICD-10-CM

## 2021-06-02 DIAGNOSIS — L20.89 OTHER ATOPIC DERMATITIS: ICD-10-CM

## 2021-06-02 PROBLEM — L20.84 INTRINSIC (ALLERGIC) ECZEMA: Status: ACTIVE | Noted: 2021-06-02

## 2021-06-02 PROCEDURE — 99213 OFFICE O/P EST LOW 20 MIN: CPT

## 2021-06-02 PROCEDURE — ? PRESCRIPTION

## 2021-06-02 PROCEDURE — ? COUNSELING

## 2021-06-02 PROCEDURE — ? MEDICATION COUNSELING

## 2021-06-02 RX ORDER — TRIAMCINOLONE ACETONIDE 1 MG/G
1 CREAM TOPICAL
Qty: 1 | Refills: 6 | Status: ERX | COMMUNITY
Start: 2021-06-02

## 2021-06-02 RX ADMIN — TRIAMCINOLONE ACETONIDE 1: 1 CREAM TOPICAL at 00:00

## 2021-06-02 ASSESSMENT — LOCATION ZONE DERM
LOCATION ZONE: TRUNK
LOCATION ZONE: SCALP
LOCATION ZONE: FACE
LOCATION ZONE: HAND

## 2021-06-02 ASSESSMENT — LOCATION DETAILED DESCRIPTION DERM
LOCATION DETAILED: LEFT SUPERIOR PARIETAL SCALP
LOCATION DETAILED: LEFT INFERIOR UPPER BACK
LOCATION DETAILED: LEFT CENTRAL MALAR CHEEK
LOCATION DETAILED: RIGHT RADIAL DORSAL HAND
LOCATION DETAILED: LEFT SUPERIOR MEDIAL LOWER BACK
LOCATION DETAILED: LEFT SUPERIOR MEDIAL MIDBACK

## 2021-06-02 ASSESSMENT — LOCATION SIMPLE DESCRIPTION DERM
LOCATION SIMPLE: LEFT LOWER BACK
LOCATION SIMPLE: RIGHT HAND
LOCATION SIMPLE: LEFT UPPER BACK
LOCATION SIMPLE: LEFT CHEEK
LOCATION SIMPLE: SCALP

## 2021-06-02 NOTE — PROCEDURE: MEDICATION COUNSELING
Tranexamic Acid Counseling:  Patient advised of the small risk of bleeding problems with tranexamic acid. They were also instructed to call if they developed any nausea, vomiting or diarrhea. All of the patient's questions and concerns were addressed.
Clofazimine Pregnancy And Lactation Text: This medication is Pregnancy Category C and isn't considered safe during pregnancy. It is excreted in breast milk.
Topical Retinoid Pregnancy And Lactation Text: This medication is Pregnancy Category C. It is unknown if this medication is excreted in breast milk.
Albendazole Pregnancy And Lactation Text: This medication is Pregnancy Category C and it isn't known if it is safe during pregnancy. It is also excreted in breast milk.
Doxycycline Counseling:  Patient counseled regarding possible photosensitivity and increased risk for sunburn.  Patient instructed to avoid sunlight, if possible.  When exposed to sunlight, patients should wear protective clothing, sunglasses, and sunscreen.  The patient was instructed to call the office immediately if the following severe adverse effects occur:  hearing changes, easy bruising/bleeding, severe headache, or vision changes.  The patient verbalized understanding of the proper use and possible adverse effects of doxycycline.  All of the patient's questions and concerns were addressed.
Ivermectin Counseling:  Patient instructed to take medication on an empty stomach with a full glass of water.  Patient informed of potential adverse effects including but not limited to nausea, diarrhea, dizziness, itching, and swelling of the extremities or lymph nodes.  The patient verbalized understanding of the proper use and possible adverse effects of ivermectin.  All of the patient's questions and concerns were addressed.
Griseofulvin Counseling:  I discussed with the patient the risks of griseofulvin including but not limited to photosensitivity, cytopenia, liver damage, nausea/vomiting and severe allergy.  The patient understands that this medication is best absorbed when taken with a fatty meal (e.g., ice cream or french fries).
Nsaids Counseling: NSAID Counseling: I discussed with the patient that NSAIDs should be taken with food. Prolonged use of NSAIDs can result in the development of stomach ulcers.  Patient advised to stop taking NSAIDs if abdominal pain occurs.  The patient verbalized understanding of the proper use and possible adverse effects of NSAIDs.  All of the patient's questions and concerns were addressed.
Infliximab Pregnancy And Lactation Text: This medication is Pregnancy Category B and is considered safe during pregnancy. It is unknown if this medication is excreted in breast milk.
Include Pregnancy/Lactation Warning?: No
Xolair Pregnancy And Lactation Text: This medication is Pregnancy Category B and is considered safe during pregnancy. This medication is excreted in breast milk.
Bexarotene Pregnancy And Lactation Text: This medication is Pregnancy Category X and should not be given to women who are pregnant or may become pregnant. This medication should not be used if you are breast feeding.
Colchicine Counseling:  Patient counseled regarding adverse effects including but not limited to stomach upset (nausea, vomiting, stomach pain, or diarrhea).  Patient instructed to limit alcohol consumption while taking this medication.  Colchicine may reduce blood counts especially with prolonged use.  The patient understands that monitoring of kidney function and blood counts may be required, especially at baseline. The patient verbalized understanding of the proper use and possible adverse effects of colchicine.  All of the patient's questions and concerns were addressed.
Nsaids Pregnancy And Lactation Text: These medications are considered safe up to 30 weeks gestation. It is excreted in breast milk.
Doxycycline Pregnancy And Lactation Text: This medication is Pregnancy Category D and not consider safe during pregnancy. It is also excreted in breast milk but is considered safe for shorter treatment courses.
Tranexamic Acid Pregnancy And Lactation Text: It is unknown if this medication is safe during pregnancy or breast feeding.
Tazorac Counseling:  Patient advised that medication is irritating and drying.  Patient may need to apply sparingly and wash off after an hour before eventually leaving it on overnight.  The patient verbalized understanding of the proper use and possible adverse effects of tazorac.  All of the patient's questions and concerns were addressed.
Erythromycin Counseling:  I discussed with the patient the risks of erythromycin including but not limited to GI upset, allergic reaction, drug rash, diarrhea, increase in liver enzymes, and yeast infections.
Griseofulvin Pregnancy And Lactation Text: This medication is Pregnancy Category X and is known to cause serious birth defects. It is unknown if this medication is excreted in breast milk but breast feeding should be avoided.
Valtrex Counseling: I discussed with the patient the risks of valacyclovir including but not limited to kidney damage, nausea, vomiting and severe allergy.  The patient understands that if the infection seems to be worsening or is not improving, they are to call.
Isotretinoin Counseling: Patient should get monthly blood tests, not donate blood, not drive at night if vision affected, not share medication, and not undergo elective surgery for 6 months after tx completed. Side effects reviewed, pt to contact office should one occur.
Rituxan Counseling:  I discussed with the patient the risks of Rituxan infusions. Side effects can include infusion reactions, severe drug rashes including mucocutaneous reactions, reactivation of latent hepatitis and other infections and rarely progressive multifocal leukoencephalopathy.  All of the patient's questions and concerns were addressed.
Eucrisa Counseling: Patient may experience a mild burning sensation during topical application. Eucrisa is not approved in children less than 2 years of age.
Azathioprine Counseling:  I discussed with the patient the risks of azathioprine including but not limited to myelosuppression, immunosuppression, hepatotoxicity, lymphoma, and infections.  The patient understands that monitoring is required including baseline LFTs, Creatinine, possible TPMP genotyping and weekly CBCs for the first month and then every 2 weeks thereafter.  The patient verbalized understanding of the proper use and possible adverse effects of azathioprine.  All of the patient's questions and concerns were addressed.
Tazorac Pregnancy And Lactation Text: This medication is not safe during pregnancy. It is unknown if this medication is excreted in breast milk.
Isotretinoin Pregnancy And Lactation Text: This medication is Pregnancy Category X and is considered extremely dangerous during pregnancy. It is unknown if it is excreted in breast milk.
Odomzo Counseling- I discussed with the patient the risks of Odomzo including but not limited to nausea, vomiting, diarrhea, constipation, weight loss, changes in the sense of taste, decreased appetite, muscle spasms, and hair loss.  The patient verbalized understanding of the proper use and possible adverse effects of Odomzo.  All of the patient's questions and concerns were addressed.
Itraconazole Counseling:  I discussed with the patient the risks of itraconazole including but not limited to liver damage, nausea/vomiting, neuropathy, and severe allergy.  The patient understands that this medication is best absorbed when taken with acidic beverages such as non-diet cola or ginger ale.  The patient understands that monitoring is required including baseline LFTs and repeat LFTs at intervals.  The patient understands that they are to contact us or the primary physician if concerning signs are noted.
Dapsone Counseling: I discussed with the patient the risks of dapsone including but not limited to hemolytic anemia, agranulocytosis, rashes, methemoglobinemia, kidney failure, peripheral neuropathy, headaches, GI upset, and liver toxicity.  Patients who start dapsone require monitoring including baseline LFTs and weekly CBCs for the first month, then every month thereafter.  The patient verbalized understanding of the proper use and possible adverse effects of dapsone.  All of the patient's questions and concerns were addressed.
Eucrisa Pregnancy And Lactation Text: This medication has not been assigned a Pregnancy Risk Category but animal studies failed to show danger with the topical medication. It is unknown if the medication is excreted in breast milk.
Rituxan Pregnancy And Lactation Text: This medication is Pregnancy Category C and it isn't know if it is safe during pregnancy. It is unknown if this medication is excreted in breast milk but similar antibodies are known to be excreted.
Topical Clindamycin Counseling: Patient counseled that this medication may cause skin irritation or allergic reactions.  In the event of skin irritation, the patient was advised to reduce the amount of the drug applied or use it less frequently.   The patient verbalized understanding of the proper use and possible adverse effects of clindamycin.  All of the patient's questions and concerns were addressed.
Erythromycin Pregnancy And Lactation Text: This medication is Pregnancy Category B and is considered safe during pregnancy. It is also excreted in breast milk.
Odomzo Pregnancy And Lactation Text: This medication is Pregnancy Category X and is absolutely contraindicated during pregnancy. It is unknown if it is excreted in breast milk.
Siliq Counseling:  I discussed with the patient the risks of Siliq including but not limited to new or worsening depression, suicidal thoughts and behavior, immunosuppression, malignancy, posterior leukoencephalopathy syndrome, and serious infections.  The patient understands that monitoring is required including a PPD at baseline and must alert us or the primary physician if symptoms of infection or other concerning signs are noted. There is also a special program designed to monitor depression which is required with Siliq.
Azathioprine Pregnancy And Lactation Text: This medication is Pregnancy Category D and isn't considered safe during pregnancy. It is unknown if this medication is excreted in breast milk.
High Dose Vitamin A Counseling: Side effects reviewed, pt to contact office should one occur.
Dapsone Pregnancy And Lactation Text: This medication is Pregnancy Category C and is not considered safe during pregnancy or breast feeding.
Valtrex Pregnancy And Lactation Text: this medication is Pregnancy Category B and is considered safe during pregnancy. This medication is not directly found in breast milk but it's metabolite acyclovir is present.
Metronidazole Counseling:  I discussed with the patient the risks of metronidazole including but not limited to seizures, nausea/vomiting, a metallic taste in the mouth, nausea/vomiting and severe allergy.
Hydroquinone Counseling:  Patient advised that medication may result in skin irritation, lightening (hypopigmentation), dryness, and burning.  In the event of skin irritation, the patient was advised to reduce the amount of the drug applied or use it less frequently.  Rarely, spots that are treated with hydroquinone can become darker (pseudoochronosis).  Should this occur, patient instructed to stop medication and call the office. The patient verbalized understanding of the proper use and possible adverse effects of hydroquinone.  All of the patient's questions and concerns were addressed.
Itraconazole Pregnancy And Lactation Text: This medication is Pregnancy Category C and it isn't know if it is safe during pregnancy. It is also excreted in breast milk.
Topical Clindamycin Pregnancy And Lactation Text: This medication is Pregnancy Category B and is considered safe during pregnancy. It is unknown if it is excreted in breast milk.
High Dose Vitamin A Pregnancy And Lactation Text: High dose vitamin A therapy is contraindicated during pregnancy and breast feeding.
Cellcept Counseling:  I discussed with the patient the risks of mycophenolate mofetil including but not limited to infection/immunosuppression, GI upset, hypokalemia, hypercholesterolemia, bone marrow suppression, lymphoproliferative disorders, malignancy, GI ulceration/bleed/perforation, colitis, interstitial lung disease, kidney failure, progressive multifocal leukoencephalopathy, and birth defects.  The patient understands that monitoring is required including a baseline creatinine and regular CBC testing. In addition, patient must alert us immediately if symptoms of infection or other concerning signs are noted.
Ketoconazole Counseling:   Patient counseled regarding improving absorption with orange juice.  Adverse effects include but are not limited to breast enlargement, headache, diarrhea, nausea, upset stomach, liver function test abnormalities, taste disturbance, and stomach pain.  There is a rare possibility of liver failure that can occur when taking ketoconazole. The patient understands that monitoring of LFTs may be required, especially at baseline. The patient verbalized understanding of the proper use and possible adverse effects of ketoconazole.  All of the patient's questions and concerns were addressed.
Siliq Pregnancy And Lactation Text: The risk during pregnancy and breastfeeding is uncertain with this medication.
Otezla Counseling: The side effects of Otezla were discussed with the patient, including but not limited to worsening or new depression, weight loss, diarrhea, nausea, upper respiratory tract infection, and headache. Patient instructed to call the office should any adverse effect occur.  The patient verbalized understanding of the proper use and possible adverse effects of Otezla.  All the patient's questions and concerns were addressed.
Erivedge Counseling- I discussed with the patient the risks of Erivedge including but not limited to nausea, vomiting, diarrhea, constipation, weight loss, changes in the sense of taste, decreased appetite, muscle spasms, and hair loss.  The patient verbalized understanding of the proper use and possible adverse effects of Erivedge.  All of the patient's questions and concerns were addressed.
Metronidazole Pregnancy And Lactation Text: This medication is Pregnancy Category B and considered safe during pregnancy.  It is also excreted in breast milk.
Topical Sulfur Applications Counseling: Topical Sulfur Counseling: Patient counseled that this medication may cause skin irritation or allergic reactions.  In the event of skin irritation, the patient was advised to reduce the amount of the drug applied or use it less frequently.   The patient verbalized understanding of the proper use and possible adverse effects of topical sulfur application.  All of the patient's questions and concerns were addressed.
Cimzia Counseling:  I discussed with the patient the risks of Cimzia including but not limited to immunosuppression, allergic reactions and infections.  The patient understands that monitoring is required including a PPD at baseline and must alert us or the primary physician if symptoms of infection or other concerning signs are noted.
Otezla Pregnancy And Lactation Text: This medication is Pregnancy Category C and it isn't known if it is safe during pregnancy. It is unknown if it is excreted in breast milk.
Ketoconazole Pregnancy And Lactation Text: This medication is Pregnancy Category C and it isn't know if it is safe during pregnancy. It is also excreted in breast milk and breast feeding isn't recommended.
Imiquimod Counseling:  I discussed with the patient the risks of imiquimod including but not limited to erythema, scaling, itching, weeping, crusting, and pain.  Patient understands that the inflammatory response to imiquimod is variable from person to person and was educated regarded proper titration schedule.  If flu-like symptoms develop, patient knows to discontinue the medication and contact us.
Simponi Counseling:  I discussed with the patient the risks of golimumab including but not limited to myelosuppression, immunosuppression, autoimmune hepatitis, demyelinating diseases, lymphoma, and serious infections.  The patient understands that monitoring is required including a PPD at baseline and must alert us or the primary physician if symptoms of infection or other concerning signs are noted.
Topical Sulfur Applications Pregnancy And Lactation Text: This medication is Pregnancy Category C and has an unknown safety profile during pregnancy. It is unknown if this topical medication is excreted in breast milk.
Cyclophosphamide Counseling:  I discussed with the patient the risks of cyclophosphamide including but not limited to hair loss, hormonal abnormalities, decreased fertility, abdominal pain, diarrhea, nausea and vomiting, bone marrow suppression and infection. The patient understands that monitoring is required while taking this medication.
Minocycline Counseling: Patient advised regarding possible photosensitivity and discoloration of the teeth, skin, lips, tongue and gums.  Patient instructed to avoid sunlight, if possible.  When exposed to sunlight, patients should wear protective clothing, sunglasses, and sunscreen.  The patient was instructed to call the office immediately if the following severe adverse effects occur:  hearing changes, easy bruising/bleeding, severe headache, or vision changes.  The patient verbalized understanding of the proper use and possible adverse effects of minocycline.  All of the patient's questions and concerns were addressed.
Wartpeel Counseling:  I discussed with the patient the risks of Wartpeel including but not limited to erythema, scaling, itching, weeping, crusting, and pain.
Minocycline Pregnancy And Lactation Text: This medication is Pregnancy Category D and not consider safe during pregnancy. It is also excreted in breast milk.
Oxybutynin Counseling:  I discussed with the patient the risks of oxybutynin including but not limited to skin rash, drowsiness, dry mouth, difficulty urinating, and blurred vision.
Cimzia Pregnancy And Lactation Text: This medication crosses the placenta but can be considered safe in certain situations. Cimzia may be excreted in breast milk.
Finasteride Male Counseling: Finasteride Counseling:  I discussed with the patient the risks of use of finasteride including but not limited to decreased libido, decreased ejaculate volume, gynecomastia, and depression. Women should not handle medication.  All of the patient's questions and concerns were addressed.
Picato Counseling:  I discussed with the patient the risks of Picato including but not limited to erythema, scaling, itching, weeping, crusting, and pain.
Cyclophosphamide Pregnancy And Lactation Text: This medication is Pregnancy Category D and it isn't considered safe during pregnancy. This medication is excreted in breast milk.
Minoxidil Counseling: Minoxidil is a topical medication which can increase blood flow where it is applied. It is uncertain how this medication increases hair growth. Side effects are uncommon and include stinging and allergic reactions.
Benzoyl Peroxide Counseling: Patient counseled that medicine may cause skin irritation and bleach clothing.  In the event of skin irritation, the patient was advised to reduce the amount of the drug applied or use it less frequently.   The patient verbalized understanding of the proper use and possible adverse effects of benzoyl peroxide.  All of the patient's questions and concerns were addressed.
Terbinafine Counseling: Patient counseling regarding adverse effects of terbinafine including but not limited to headache, diarrhea, rash, upset stomach, liver function test abnormalities, itching, taste/smell disturbance, nausea, abdominal pain, and flatulence.  There is a rare possibility of liver failure that can occur when taking terbinafine.  The patient understands that a baseline LFT and kidney function test may be required. The patient verbalized understanding of the proper use and possible adverse effects of terbinafine.  All of the patient's questions and concerns were addressed.
Skyrizi Counseling: I discussed with the patient the risks of risankizumab-rzaa including but not limited to immunosuppression, and serious infections.  The patient understands that monitoring is required including a PPD at baseline and must alert us or the primary physician if symptoms of infection or other concerning signs are noted.
Terbinafine Pregnancy And Lactation Text: This medication is Pregnancy Category B and is considered safe during pregnancy. It is also excreted in breast milk and breast feeding isn't recommended.
Quinolones Counseling:  I discussed with the patient the risks of fluoroquinolones including but not limited to GI upset, allergic reaction, drug rash, diarrhea, dizziness, photosensitivity, yeast infections, liver function test abnormalities, tendonitis/tendon rupture.
Finasteride Pregnancy And Lactation Text: This medication is absolutely contraindicated during pregnancy. It is unknown if it is excreted in breast milk.
Wartpeel Pregnancy And Lactation Text: This medication is Pregnancy Category X and contraindicated in pregnancy and in women who may become pregnant. It is unknown if this medication is excreted in breast milk.
Cosentyx Counseling:  I discussed with the patient the risks of Cosentyx including but not limited to worsening of Crohn's disease, immunosuppression, allergic reactions and infections.  The patient understands that monitoring is required including a PPD at baseline and must alert us or the primary physician if symptoms of infection or other concerning signs are noted.
Propranolol Counseling:  I discussed with the patient the risks of propranolol including but not limited to low heart rate, low blood pressure, low blood sugar, restlessness and increased cold sensitivity. They should call the office if they experience any of these side effects.
Cyclosporine Counseling:  I discussed with the patient the risks of cyclosporine including but not limited to hypertension, gingival hyperplasia,myelosuppression, immunosuppression, liver damage, kidney damage, neurotoxicity, lymphoma, and serious infections. The patient understands that monitoring is required including baseline blood pressure, CBC, CMP, lipid panel and uric acid, and then 1-2 times monthly CMP and blood pressure.
Gabapentin Counseling: I discussed with the patient the risks of gabapentin including but not limited to dizziness, somnolence, fatigue and ataxia.
Benzoyl Peroxide Pregnancy And Lactation Text: This medication is Pregnancy Category C. It is unknown if benzoyl peroxide is excreted in breast milk.
Cyclosporine Pregnancy And Lactation Text: This medication is Pregnancy Category C and it isn't know if it is safe during pregnancy. This medication is excreted in breast milk.
Propranolol Pregnancy And Lactation Text: This medication is Pregnancy Category C and it isn't known if it is safe during pregnancy. It is excreted in breast milk.
Zyclara Counseling:  I discussed with the patient the risks of imiquimod including but not limited to erythema, scaling, itching, weeping, crusting, and pain.  Patient understands that the inflammatory response to imiquimod is variable from person to person and was educated regarded proper titration schedule.  If flu-like symptoms develop, patient knows to discontinue the medication and contact us.
Protopic Counseling: Patient may experience a mild burning sensation during topical application. Protopic is not approved in children less than 2 years of age. There have been case reports of hematologic and skin malignancies in patients using topical calcineurin inhibitors although causality is questionable.
Azithromycin Counseling:  I discussed with the patient the risks of azithromycin including but not limited to GI upset, allergic reaction, drug rash, diarrhea, and yeast infections.
Stelara Counseling:  I discussed with the patient the risks of ustekinumab including but not limited to immunosuppression, malignancy, posterior leukoencephalopathy syndrome, and serious infections.  The patient understands that monitoring is required including a PPD at baseline and must alert us or the primary physician if symptoms of infection or other concerning signs are noted.
Cimetidine Counseling:  I discussed with the patient the risks of Cimetidine including but not limited to gynecomastia, headache, diarrhea, nausea, drowsiness, arrhythmias, pancreatitis, skin rashes, psychosis, bone marrow suppression and kidney toxicity.
Rifampin Counseling: I discussed with the patient the risks of rifampin including but not limited to liver damage, kidney damage, red-orange body fluids, nausea/vomiting and severe allergy.
Carac Counseling:  I discussed with the patient the risks of Carac including but not limited to erythema, scaling, itching, weeping, crusting, and pain.
Dupixent Counseling: I discussed with the patient the risks of dupilumab including but not limited to eye infection and irritation, cold sores, injection site reactions, worsening of asthma, allergic reactions and increased risk of parasitic infection.  Live vaccines should be avoided while taking dupilumab. Dupilumab will also interact with certain medications such as warfarin and cyclosporine. The patient understands that monitoring is required and they must alert us or the primary physician if symptoms of infection or other concerning signs are noted.
Mirvaso Counseling: Mirvaso is a topical medication which can decrease superficial blood flow where applied. Side effects are uncommon and include stinging, redness and allergic reactions.
Mirvaso Pregnancy And Lactation Text: This medication has not been assigned a Pregnancy Risk Category. It is unknown if the medication is excreted in breast milk.
Protopic Pregnancy And Lactation Text: This medication is Pregnancy Category C. It is unknown if this medication is excreted in breast milk when applied topically.
Methotrexate Counseling:  Patient counseled regarding adverse effects of methotrexate including but not limited to nausea, vomiting, abnormalities in liver function tests. Patients may develop mouth sores, rash, diarrhea, and abnormalities in blood counts. The patient understands that monitoring is required including LFT's and blood counts.  There is a rare possibility of scarring of the liver and lung problems that can occur when taking methotrexate. Persistent nausea, loss of appetite, pale stools, dark urine, cough, and shortness of breath should be reported immediately. Patient advised to discontinue methotrexate treatment at least three months before attempting to become pregnant.  I discussed the need for folate supplements while taking methotrexate.  These supplements can decrease side effects during methotrexate treatment. The patient verbalized understanding of the proper use and possible adverse effects of methotrexate.  All of the patient's questions and concerns were addressed.
Birth Control Pills Counseling: Birth Control Pill Counseling: I discussed with the patient the potential side effects of OCPs including but not limited to increased risk of stroke, heart attack, thrombophlebitis, deep venous thrombosis, hepatic adenomas, breast changes, GI upset, headaches, and depression.  The patient verbalized understanding of the proper use and possible adverse effects of OCPs. All of the patient's questions and concerns were addressed.
Glycopyrrolate Counseling:  I discussed with the patient the risks of glycopyrrolate including but not limited to skin rash, drowsiness, dry mouth, difficulty urinating, and blurred vision.
Rhofade Counseling: Rhofade is a topical medication which can decrease superficial blood flow where applied. Side effects are uncommon and include stinging, redness and allergic reactions.
Dupixent Pregnancy And Lactation Text: This medication likely crosses the placenta but the risk for the fetus is uncertain. This medication is excreted in breast milk.
Azithromycin Pregnancy And Lactation Text: This medication is considered safe during pregnancy and is also secreted in breast milk.
Rifampin Pregnancy And Lactation Text: This medication is Pregnancy Category C and it isn't know if it is safe during pregnancy. It is also excreted in breast milk and should not be used if you are breast feeding.
Birth Control Pills Pregnancy And Lactation Text: This medication should be avoided if pregnant and for the first 30 days post-partum.
Methotrexate Pregnancy And Lactation Text: This medication is Pregnancy Category X and is known to cause fetal harm. This medication is excreted in breast milk.
Bactrim Counseling:  I discussed with the patient the risks of sulfa antibiotics including but not limited to GI upset, allergic reaction, drug rash, diarrhea, dizziness, photosensitivity, and yeast infections.  Rarely, more serious reactions can occur including but not limited to aplastic anemia, agranulocytosis, methemoglobinemia, blood dyscrasias, liver or kidney failure, lung infiltrates or desquamative/blistering drug rashes.
Taltz Counseling: I discussed with the patient the risks of ixekizumab including but not limited to immunosuppression, serious infections, worsening of inflammatory bowel disease and drug reactions.  The patient understands that monitoring is required including a PPD at baseline and must alert us or the primary physician if symptoms of infection or other concerning signs are noted.
Glycopyrrolate Pregnancy And Lactation Text: This medication is Pregnancy Category B and is considered safe during pregnancy. It is unknown if it is excreted breast milk.
Sarecycline Counseling: Patient advised regarding possible photosensitivity and discoloration of the teeth, skin, lips, tongue and gums.  Patient instructed to avoid sunlight, if possible.  When exposed to sunlight, patients should wear protective clothing, sunglasses, and sunscreen.  The patient was instructed to call the office immediately if the following severe adverse effects occur:  hearing changes, easy bruising/bleeding, severe headache, or vision changes.  The patient verbalized understanding of the proper use and possible adverse effects of sarecycline.  All of the patient's questions and concerns were addressed.
Enbrel Counseling:  I discussed with the patient the risks of etanercept including but not limited to myelosuppression, immunosuppression, autoimmune hepatitis, demyelinating diseases, lymphoma, and infections.  The patient understands that monitoring is required including a PPD at baseline and must alert us or the primary physician if symptoms of infection or other concerning signs are noted.
Doxepin Counseling:  Patient advised that the medication is sedating and not to drive a car after taking this medication. Patient informed of potential adverse effects including but not limited to dry mouth, urinary retention, and blurry vision.  The patient verbalized understanding of the proper use and possible adverse effects of doxepin.  All of the patient's questions and concerns were addressed.
Calcipotriene Counseling:  I discussed with the patient the risks of calcipotriene including but not limited to erythema, scaling, itching, and irritation.
Opioid Counseling: I discussed with the patient the potential side effects of opioids including but not limited to addiction, altered mental status, and depression. I stressed avoiding alcohol, benzodiazepines, muscle relaxants and sleep aids unless specifically okayed by a physician. The patient verbalized understanding of the proper use and possible adverse effects of opioids. All of the patient's questions and concerns were addressed. They were instructed to flush the remaining pills down the toilet if they did not need them for pain.
Calcipotriene Pregnancy And Lactation Text: This medication has not been proven safe during pregnancy. It is unknown if this medication is excreted in breast milk.
Prednisone Counseling:  I discussed with the patient the risks of prolonged use of prednisone including but not limited to weight gain, insomnia, osteoporosis, mood changes, diabetes, susceptibility to infection, glaucoma and high blood pressure.  In cases where prednisone use is prolonged, patients should be monitored with blood pressure checks, serum glucose levels and an eye exam.  Additionally, the patient may need to be placed on GI prophylaxis, PCP prophylaxis, and calcium and vitamin D supplementation and/or a bisphosphonate.  The patient verbalized understanding of the proper use and the possible adverse effects of prednisone.  All of the patient's questions and concerns were addressed.
Bactrim Pregnancy And Lactation Text: This medication is Pregnancy Category D and is known to cause fetal risk.  It is also excreted in breast milk.
Spironolactone Counseling: Patient advised regarding risks of diarrhea, abdominal pain, hyperkalemia, birth defects (for female patients), liver toxicity and renal toxicity. The patient may need blood work to monitor liver and kidney function and potassium levels while on therapy. The patient verbalized understanding of the proper use and possible adverse effects of spironolactone.  All of the patient's questions and concerns were addressed.
Doxepin Pregnancy And Lactation Text: This medication is Pregnancy Category C and it isn't known if it is safe during pregnancy. It is also excreted in breast milk and breast feeding isn't recommended.
Hydroxychloroquine Counseling:  I discussed with the patient that a baseline ophthalmologic exam is needed at the start of therapy and every year thereafter while on therapy. A CBC may also be warranted for monitoring.  The side effects of this medication were discussed with the patient, including but not limited to agranulocytosis, aplastic anemia, seizures, rashes, retinopathy, and liver toxicity. Patient instructed to call the office should any adverse effect occur.  The patient verbalized understanding of the proper use and possible adverse effects of Plaquenil.  All the patient's questions and concerns were addressed.
Opioid Pregnancy And Lactation Text: These medications can lead to premature delivery and should be avoided during pregnancy. These medications are also present in breast milk in small amounts.
Solaraze Counseling:  I discussed with the patient the risks of Solaraze including but not limited to erythema, scaling, itching, weeping, crusting, and pain.
Hydroxyzine Counseling: Patient advised that the medication is sedating and not to drive a car after taking this medication.  Patient informed of potential adverse effects including but not limited to dry mouth, urinary retention, and blurry vision.  The patient verbalized understanding of the proper use and possible adverse effects of hydroxyzine.  All of the patient's questions and concerns were addressed.
Hydroxychloroquine Pregnancy And Lactation Text: This medication has been shown to cause fetal harm but it isn't assigned a Pregnancy Risk Category. There are small amounts excreted in breast milk.
Humira Counseling:  I discussed with the patient the risks of adalimumab including but not limited to myelosuppression, immunosuppression, autoimmune hepatitis, demyelinating diseases, lymphoma, and serious infections.  The patient understands that monitoring is required including a PPD at baseline and must alert us or the primary physician if symptoms of infection or other concerning signs are noted.
Solaraze Pregnancy And Lactation Text: This medication is Pregnancy Category B and is considered safe. There is some data to suggest avoiding during the third trimester. It is unknown if this medication is excreted in breast milk.
Tremfya Counseling: I discussed with the patient the risks of guselkumab including but not limited to immunosuppression, serious infections, worsening of inflammatory bowel disease and drug reactions.  The patient understands that monitoring is required including a PPD at baseline and must alert us or the primary physician if symptoms of infection or other concerning signs are noted.
5-Fu Counseling: 5-Fluorouracil Counseling:  I discussed with the patient the risks of 5-fluorouracil including but not limited to erythema, scaling, itching, weeping, crusting, and pain.
Spironolactone Pregnancy And Lactation Text: This medication can cause feminization of the male fetus and should be avoided during pregnancy. The active metabolite is also found in breast milk.
Cephalexin Counseling: I counseled the patient regarding use of cephalexin as an antibiotic for prophylactic and/or therapeutic purposes. Cephalexin (commonly prescribed under brand name Keflex) is a cephalosporin antibiotic which is active against numerous classes of bacteria, including most skin bacteria. Side effects may include nausea, diarrhea, gastrointestinal upset, rash, hives, yeast infections, and in rare cases, hepatitis, kidney disease, seizures, fever, confusion, neurologic symptoms, and others. Patients with severe allergies to penicillin medications are cautioned that there is about a 10% incidence of cross-reactivity with cephalosporins. When possible, patients with penicillin allergies should use alternatives to cephalosporins for antibiotic therapy.
Tetracycline Counseling: Patient counseled regarding possible photosensitivity and increased risk for sunburn.  Patient instructed to avoid sunlight, if possible.  When exposed to sunlight, patients should wear protective clothing, sunglasses, and sunscreen.  The patient was instructed to call the office immediately if the following severe adverse effects occur:  hearing changes, easy bruising/bleeding, severe headache, or vision changes.  The patient verbalized understanding of the proper use and possible adverse effects of tetracycline.  All of the patient's questions and concerns were addressed. Patient understands to avoid pregnancy while on therapy due to potential birth defects.
Cephalexin Pregnancy And Lactation Text: This medication is Pregnancy Category B and considered safe during pregnancy.  It is also excreted in breast milk but can be used safely for shorter doses.
SSKI Counseling:  I discussed with the patient the risks of SSKI including but not limited to thyroid abnormalities, metallic taste, GI upset, fever, headache, acne, arthralgias, paraesthesias, lymphadenopathy, easy bleeding, arrhythmias, and allergic reaction.
Libtayo Counseling- I discussed with the patient the risks of Libtayo including but not limited to nausea, vomiting, diarrhea, and bone or muscle pain.  The patient verbalized understanding of the proper use and possible adverse effects of Libtayo.  All of the patient's questions and concerns were addressed.
Hydroxyzine Pregnancy And Lactation Text: This medication is not safe during pregnancy and should not be taken. It is also excreted in breast milk and breast feeding isn't recommended.
Arava Counseling:  Patient counseled regarding adverse effects of Arava including but not limited to nausea, vomiting, abnormalities in liver function tests. Patients may develop mouth sores, rash, diarrhea, and abnormalities in blood counts. The patient understands that monitoring is required including LFTs and blood counts.  There is a rare possibility of scarring of the liver and lung problems that can occur when taking methotrexate. Persistent nausea, loss of appetite, pale stools, dark urine, cough, and shortness of breath should be reported immediately. Patient advised to discontinue Arava treatment and consult with a physician prior to attempting conception. The patient will have to undergo a treatment to eliminate Arava from the body prior to conception.
Drysol Counseling:  I discussed with the patient the risks of drysol/aluminum chloride including but not limited to skin rash, itching, irritation, burning.
Acitretin Counseling:  I discussed with the patient the risks of acitretin including but not limited to hair loss, dry lips/skin/eyes, liver damage, hyperlipidemia, depression/suicidal ideation, photosensitivity.  Serious rare side effects can include but are not limited to pancreatitis, pseudotumor cerebri, bony changes, clot formation/stroke/heart attack.  Patient understands that alcohol is contraindicated since it can result in liver toxicity and significantly prolong the elimination of the drug by many years.
Sski Pregnancy And Lactation Text: This medication is Pregnancy Category D and isn't considered safe during pregnancy. It is excreted in breast milk.
Libtayo Pregnancy And Lactation Text: This medication is contraindicated in pregnancy and when breast feeding.
Xeljanz Counseling: I discussed with the patient the risks of Xeljanz therapy including increased risk of infection, liver issues, headache, diarrhea, or cold symptoms. Live vaccines should be avoided. They were instructed to call if they have any problems.
Ilumya Counseling: I discussed with the patient the risks of tildrakizumab including but not limited to immunosuppression, malignancy, posterior leukoencephalopathy syndrome, and serious infections.  The patient understands that monitoring is required including a PPD at baseline and must alert us or the primary physician if symptoms of infection or other concerning signs are noted.
Clindamycin Counseling: I counseled the patient regarding use of clindamycin as an antibiotic for prophylactic and/or therapeutic purposes. Clindamycin is active against numerous classes of bacteria, including skin bacteria. Side effects may include nausea, diarrhea, gastrointestinal upset, rash, hives, yeast infections, and in rare cases, colitis.
Xeldocz Pregnancy And Lactation Text: This medication is Pregnancy Category D and is not considered safe during pregnancy.  The risk during breast feeding is also uncertain.
Niacinamide Counseling: I recommended taking niacin or niacinamide, also know as vitamin B3, twice daily. Recent evidence suggests that taking vitamin B3 (500 mg twice daily) can reduce the risk of actinic keratoses and non-melanoma skin cancers. Side effects of vitamin B3 include flushing and headache.
Thalidomide Counseling: I discussed with the patient the risks of thalidomide including but not limited to birth defects, anxiety, weakness, chest pain, dizziness, cough and severe allergy.
Acitretin Pregnancy And Lactation Text: This medication is Pregnancy Category X and should not be given to women who are pregnant or may become pregnant in the future. This medication is excreted in breast milk.
Bexarotene Counseling:  I discussed with the patient the risks of bexarotene including but not limited to hair loss, dry lips/skin/eyes, liver abnormalities, hyperlipidemia, pancreatitis, depression/suicidal ideation, photosensitivity, drug rash/allergic reactions, hypothyroidism, anemia, leukopenia, infection, cataracts, and teratogenicity.  Patient understands that they will need regular blood tests to check lipid profile, liver function tests, white blood cell count, thyroid function tests and pregnancy test if applicable.
Detail Level: Simple
Clindamycin Pregnancy And Lactation Text: This medication can be used in pregnancy if certain situations. Clindamycin is also present in breast milk.
Fluconazole Counseling:  Patient counseled regarding adverse effects of fluconazole including but not limited to headache, diarrhea, nausea, upset stomach, liver function test abnormalities, taste disturbance, and stomach pain.  There is a rare possibility of liver failure that can occur when taking fluconazole.  The patient understands that monitoring of LFTs and kidney function test may be required, especially at baseline. The patient verbalized understanding of the proper use and possible adverse effects of fluconazole.  All of the patient's questions and concerns were addressed.
Clofazimine Counseling:  I discussed with the patient the risks of clofazimine including but not limited to skin and eye pigmentation, liver damage, nausea/vomiting, gastrointestinal bleeding and allergy.
Albendazole Counseling:  I discussed with the patient the risks of albendazole including but not limited to cytopenia, kidney damage, nausea/vomiting and severe allergy.  The patient understands that this medication is being used in an off-label manner.
Drysol Pregnancy And Lactation Text: This medication is considered safe during pregnancy and breast feeding.
Elidel Counseling: Patient may experience a mild burning sensation during topical application. Elidel is not approved in children less than 2 years of age. There have been case reports of hematologic and skin malignancies in patients using topical calcineurin inhibitors although causality is questionable.
Xolair Counseling:  Patient informed of potential adverse effects including but not limited to fever, muscle aches, rash and allergic reactions.  The patient verbalized understanding of the proper use and possible adverse effects of Xolair.  All of the patient's questions and concerns were addressed.
Topical Retinoid counseling:  Patient advised to apply a pea-sized amount only at bedtime and wait 30 minutes after washing their face before applying.  If too drying, patient may add a non-comedogenic moisturizer. The patient verbalized understanding of the proper use and possible adverse effects of retinoids.  All of the patient's questions and concerns were addressed.
Infliximab Counseling:  I discussed with the patient the risks of infliximab including but not limited to myelosuppression, immunosuppression, autoimmune hepatitis, demyelinating diseases, lymphoma, and serious infections.  The patient understands that monitoring is required including a PPD at baseline and must alert us or the primary physician if symptoms of infection or other concerning signs are noted.
Niacinamide Pregnancy And Lactation Text: These medications are considered safe during pregnancy.

## 2022-08-18 ENCOUNTER — HOSPITAL ENCOUNTER (INPATIENT)
Facility: MEDICAL CENTER | Age: 87
LOS: 4 days | DRG: 987 | End: 2022-08-22
Attending: EMERGENCY MEDICINE | Admitting: INTERNAL MEDICINE
Payer: MEDICARE

## 2022-08-18 ENCOUNTER — APPOINTMENT (OUTPATIENT)
Dept: RADIOLOGY | Facility: MEDICAL CENTER | Age: 87
DRG: 987 | End: 2022-08-18
Attending: EMERGENCY MEDICINE
Payer: MEDICARE

## 2022-08-18 ENCOUNTER — APPOINTMENT (OUTPATIENT)
Dept: RADIOLOGY | Facility: MEDICAL CENTER | Age: 87
DRG: 987 | End: 2022-08-18
Attending: INTERNAL MEDICINE
Payer: MEDICARE

## 2022-08-18 DIAGNOSIS — I35.0 SEVERE AORTIC STENOSIS: ICD-10-CM

## 2022-08-18 PROBLEM — R55 SYNCOPE: Status: ACTIVE | Noted: 2022-08-18

## 2022-08-18 PROBLEM — I44.7 LEFT BUNDLE BRANCH BLOCK: Status: ACTIVE | Noted: 2022-08-18

## 2022-08-18 PROBLEM — I10 PRIMARY HYPERTENSION: Status: ACTIVE | Noted: 2022-08-18

## 2022-08-18 PROBLEM — R55 SYNCOPE AND COLLAPSE: Status: ACTIVE | Noted: 2022-08-18

## 2022-08-18 PROBLEM — I65.21 STENOSIS OF RIGHT CAROTID ARTERY: Status: ACTIVE | Noted: 2022-08-18

## 2022-08-18 LAB
ALBUMIN SERPL BCP-MCNC: 3.1 G/DL (ref 3.2–4.9)
ALBUMIN/GLOB SERPL: 1.4 G/DL
ALP SERPL-CCNC: 47 U/L (ref 30–99)
ALT SERPL-CCNC: 11 U/L (ref 2–50)
ANION GAP SERPL CALC-SCNC: 8 MMOL/L (ref 7–16)
APPEARANCE UR: CLEAR
AST SERPL-CCNC: 16 U/L (ref 12–45)
BASOPHILS # BLD AUTO: 0.4 % (ref 0–1.8)
BASOPHILS # BLD: 0.02 K/UL (ref 0–0.12)
BILIRUB SERPL-MCNC: 0.4 MG/DL (ref 0.1–1.5)
BILIRUB UR QL STRIP.AUTO: NEGATIVE
BUN SERPL-MCNC: 27 MG/DL (ref 8–22)
CALCIUM SERPL-MCNC: 8.1 MG/DL (ref 8.5–10.5)
CHLORIDE SERPL-SCNC: 103 MMOL/L (ref 96–112)
CK SERPL-CCNC: 51 U/L (ref 0–154)
CO2 SERPL-SCNC: 25 MMOL/L (ref 20–33)
COLOR UR: YELLOW
CREAT SERPL-MCNC: 1.29 MG/DL (ref 0.5–1.4)
D DIMER PPP IA.FEU-MCNC: 1.77 UG/ML (FEU) (ref 0–0.5)
EKG IMPRESSION: NORMAL
EOSINOPHIL # BLD AUTO: 0.08 K/UL (ref 0–0.51)
EOSINOPHIL NFR BLD: 1.6 % (ref 0–6.9)
ERYTHROCYTE [DISTWIDTH] IN BLOOD BY AUTOMATED COUNT: 51.2 FL (ref 35.9–50)
GFR SERPLBLD CREATININE-BSD FMLA CKD-EPI: 52 ML/MIN/1.73 M 2
GLOBULIN SER CALC-MCNC: 2.2 G/DL (ref 1.9–3.5)
GLUCOSE SERPL-MCNC: 119 MG/DL (ref 65–99)
GLUCOSE UR STRIP.AUTO-MCNC: NEGATIVE MG/DL
HCT VFR BLD AUTO: 29 % (ref 42–52)
HGB BLD-MCNC: 9.4 G/DL (ref 14–18)
IMM GRANULOCYTES # BLD AUTO: 0.01 K/UL (ref 0–0.11)
IMM GRANULOCYTES NFR BLD AUTO: 0.2 % (ref 0–0.9)
KETONES UR STRIP.AUTO-MCNC: ABNORMAL MG/DL
LEUKOCYTE ESTERASE UR QL STRIP.AUTO: NEGATIVE
LYMPHOCYTES # BLD AUTO: 1.4 K/UL (ref 1–4.8)
LYMPHOCYTES NFR BLD: 27.9 % (ref 22–41)
MAGNESIUM SERPL-MCNC: 1.8 MG/DL (ref 1.5–2.5)
MCH RBC QN AUTO: 32.6 PG (ref 27–33)
MCHC RBC AUTO-ENTMCNC: 32.4 G/DL (ref 33.7–35.3)
MCV RBC AUTO: 100.7 FL (ref 81.4–97.8)
MICRO URNS: ABNORMAL
MONOCYTES # BLD AUTO: 0.43 K/UL (ref 0–0.85)
MONOCYTES NFR BLD AUTO: 8.6 % (ref 0–13.4)
NEUTROPHILS # BLD AUTO: 3.08 K/UL (ref 1.82–7.42)
NEUTROPHILS NFR BLD: 61.3 % (ref 44–72)
NITRITE UR QL STRIP.AUTO: NEGATIVE
NRBC # BLD AUTO: 0 K/UL
NRBC BLD-RTO: 0 /100 WBC
NT-PROBNP SERPL IA-MCNC: 8065 PG/ML (ref 0–125)
PH UR STRIP.AUTO: 5.5 [PH] (ref 5–8)
PHOSPHATE SERPL-MCNC: 3.3 MG/DL (ref 2.5–4.5)
PLATELET # BLD AUTO: 121 K/UL (ref 164–446)
PMV BLD AUTO: 10.9 FL (ref 9–12.9)
POTASSIUM SERPL-SCNC: 3.9 MMOL/L (ref 3.6–5.5)
PROLACTIN SERPL-MCNC: 43.1 NG/ML (ref 2.1–17.7)
PROT SERPL-MCNC: 5.3 G/DL (ref 6–8.2)
PROT UR QL STRIP: NEGATIVE MG/DL
RBC # BLD AUTO: 2.88 M/UL (ref 4.7–6.1)
RBC UR QL AUTO: NEGATIVE
SODIUM SERPL-SCNC: 136 MMOL/L (ref 135–145)
SP GR UR STRIP.AUTO: >=1.045
TROPONIN T SERPL-MCNC: 119 NG/L (ref 6–19)
UROBILINOGEN UR STRIP.AUTO-MCNC: 1 MG/DL
WBC # BLD AUTO: 5 K/UL (ref 4.8–10.8)

## 2022-08-18 PROCEDURE — 99285 EMERGENCY DEPT VISIT HI MDM: CPT

## 2022-08-18 PROCEDURE — 95819 EEG AWAKE AND ASLEEP: CPT | Performed by: PSYCHIATRY & NEUROLOGY

## 2022-08-18 PROCEDURE — 84484 ASSAY OF TROPONIN QUANT: CPT

## 2022-08-18 PROCEDURE — 72125 CT NECK SPINE W/O DYE: CPT

## 2022-08-18 PROCEDURE — 85379 FIBRIN DEGRADATION QUANT: CPT

## 2022-08-18 PROCEDURE — 84146 ASSAY OF PROLACTIN: CPT

## 2022-08-18 PROCEDURE — 700111 HCHG RX REV CODE 636 W/ 250 OVERRIDE (IP): Performed by: EMERGENCY MEDICINE

## 2022-08-18 PROCEDURE — 83880 ASSAY OF NATRIURETIC PEPTIDE: CPT

## 2022-08-18 PROCEDURE — 80053 COMPREHEN METABOLIC PANEL: CPT

## 2022-08-18 PROCEDURE — 90471 IMMUNIZATION ADMIN: CPT

## 2022-08-18 PROCEDURE — 90715 TDAP VACCINE 7 YRS/> IM: CPT | Performed by: EMERGENCY MEDICINE

## 2022-08-18 PROCEDURE — 304217 HCHG IRRIGATION SYSTEM

## 2022-08-18 PROCEDURE — 93005 ELECTROCARDIOGRAM TRACING: CPT | Performed by: EMERGENCY MEDICINE

## 2022-08-18 PROCEDURE — 70486 CT MAXILLOFACIAL W/O DYE: CPT

## 2022-08-18 PROCEDURE — 0JQ10ZZ REPAIR FACE SUBCUTANEOUS TISSUE AND FASCIA, OPEN APPROACH: ICD-10-PCS | Performed by: EMERGENCY MEDICINE

## 2022-08-18 PROCEDURE — 304999 HCHG REPAIR-SIMPLE/INTERMED LEVEL 1

## 2022-08-18 PROCEDURE — 81003 URINALYSIS AUTO W/O SCOPE: CPT

## 2022-08-18 PROCEDURE — 85025 COMPLETE CBC W/AUTO DIFF WBC: CPT

## 2022-08-18 PROCEDURE — 700105 HCHG RX REV CODE 258: Performed by: INTERNAL MEDICINE

## 2022-08-18 PROCEDURE — 770020 HCHG ROOM/CARE - TELE (206)

## 2022-08-18 PROCEDURE — 82550 ASSAY OF CK (CPK): CPT

## 2022-08-18 PROCEDURE — 99223 1ST HOSP IP/OBS HIGH 75: CPT | Performed by: INTERNAL MEDICINE

## 2022-08-18 PROCEDURE — A9270 NON-COVERED ITEM OR SERVICE: HCPCS | Performed by: INTERNAL MEDICINE

## 2022-08-18 PROCEDURE — 700117 HCHG RX CONTRAST REV CODE 255: Performed by: EMERGENCY MEDICINE

## 2022-08-18 PROCEDURE — 4A10X4Z MONITORING OF CENTRAL NERVOUS ELECTRICAL ACTIVITY, EXTERNAL APPROACH: ICD-10-PCS | Performed by: PSYCHIATRY & NEUROLOGY

## 2022-08-18 PROCEDURE — 84100 ASSAY OF PHOSPHORUS: CPT

## 2022-08-18 PROCEDURE — 99223 1ST HOSP IP/OBS HIGH 75: CPT | Mod: AI | Performed by: INTERNAL MEDICINE

## 2022-08-18 PROCEDURE — 303747 HCHG EXTRA SUTURE

## 2022-08-18 PROCEDURE — 95819 EEG AWAKE AND ASLEEP: CPT | Mod: 26 | Performed by: PSYCHIATRY & NEUROLOGY

## 2022-08-18 PROCEDURE — 700102 HCHG RX REV CODE 250 W/ 637 OVERRIDE(OP): Performed by: INTERNAL MEDICINE

## 2022-08-18 PROCEDURE — 70496 CT ANGIOGRAPHY HEAD: CPT

## 2022-08-18 PROCEDURE — 700101 HCHG RX REV CODE 250: Performed by: EMERGENCY MEDICINE

## 2022-08-18 PROCEDURE — 3E0234Z INTRODUCTION OF SERUM, TOXOID AND VACCINE INTO MUSCLE, PERCUTANEOUS APPROACH: ICD-10-PCS | Performed by: EMERGENCY MEDICINE

## 2022-08-18 PROCEDURE — 83735 ASSAY OF MAGNESIUM: CPT

## 2022-08-18 PROCEDURE — 73090 X-RAY EXAM OF FOREARM: CPT | Mod: LT

## 2022-08-18 PROCEDURE — 71045 X-RAY EXAM CHEST 1 VIEW: CPT

## 2022-08-18 PROCEDURE — 70498 CT ANGIOGRAPHY NECK: CPT

## 2022-08-18 PROCEDURE — 36415 COLL VENOUS BLD VENIPUNCTURE: CPT

## 2022-08-18 RX ORDER — FUROSEMIDE 20 MG/1
20 TABLET ORAL EVERY MORNING
COMMUNITY

## 2022-08-18 RX ORDER — SIMVASTATIN 20 MG
40 TABLET ORAL NIGHTLY
Status: DISCONTINUED | OUTPATIENT
Start: 2022-08-18 | End: 2022-08-21

## 2022-08-18 RX ORDER — ATORVASTATIN CALCIUM 40 MG/1
40 TABLET, FILM COATED ORAL
Status: ON HOLD | COMMUNITY
End: 2022-08-22

## 2022-08-18 RX ORDER — BISACODYL 10 MG
10 SUPPOSITORY, RECTAL RECTAL
Status: DISCONTINUED | OUTPATIENT
Start: 2022-08-18 | End: 2022-08-21

## 2022-08-18 RX ORDER — LATANOPROST 50 UG/ML
1 SOLUTION/ DROPS OPHTHALMIC NIGHTLY
COMMUNITY

## 2022-08-18 RX ORDER — LISINOPRIL 40 MG/1
20-40 TABLET ORAL 2 TIMES DAILY
Status: ON HOLD | COMMUNITY
End: 2022-08-22

## 2022-08-18 RX ORDER — OMEPRAZOLE 20 MG/1
20 CAPSULE, DELAYED RELEASE ORAL DAILY
Status: DISCONTINUED | OUTPATIENT
Start: 2022-08-19 | End: 2022-08-21

## 2022-08-18 RX ORDER — BUPIVACAINE HYDROCHLORIDE AND EPINEPHRINE 5; 5 MG/ML; UG/ML
10 INJECTION, SOLUTION EPIDURAL; INTRACAUDAL; PERINEURAL ONCE
Status: COMPLETED | OUTPATIENT
Start: 2022-08-18 | End: 2022-08-18

## 2022-08-18 RX ORDER — LEVOTHYROXINE SODIUM 0.05 MG/1
50 TABLET ORAL
COMMUNITY

## 2022-08-18 RX ORDER — DILTIAZEM HYDROCHLORIDE 120 MG/1
120 CAPSULE, COATED, EXTENDED RELEASE ORAL EVERY MORNING
COMMUNITY

## 2022-08-18 RX ORDER — ASPIRIN 81 MG/1
81 TABLET, CHEWABLE ORAL DAILY
Status: DISCONTINUED | OUTPATIENT
Start: 2022-08-19 | End: 2022-08-21

## 2022-08-18 RX ORDER — AMOXICILLIN 250 MG
2 CAPSULE ORAL 2 TIMES DAILY
Status: DISCONTINUED | OUTPATIENT
Start: 2022-08-19 | End: 2022-08-21

## 2022-08-18 RX ORDER — ENOXAPARIN SODIUM 100 MG/ML
40 INJECTION SUBCUTANEOUS DAILY
Status: DISCONTINUED | OUTPATIENT
Start: 2022-08-19 | End: 2022-08-21

## 2022-08-18 RX ORDER — CHLORAL HYDRATE 500 MG
1000 CAPSULE ORAL
Status: DISCONTINUED | OUTPATIENT
Start: 2022-08-18 | End: 2022-08-21

## 2022-08-18 RX ORDER — ACETAMINOPHEN 325 MG/1
650 TABLET ORAL EVERY 6 HOURS PRN
Status: DISCONTINUED | OUTPATIENT
Start: 2022-08-18 | End: 2022-08-21

## 2022-08-18 RX ORDER — MULTIVIT-MIN/FA/LYCOPEN/LUTEIN .4-300-25
1 TABLET ORAL EVERY MORNING
COMMUNITY

## 2022-08-18 RX ORDER — TIMOLOL MALEATE 5 MG/ML
1 SOLUTION/ DROPS OPHTHALMIC DAILY
Status: DISCONTINUED | OUTPATIENT
Start: 2022-08-19 | End: 2022-08-20

## 2022-08-18 RX ORDER — POTASSIUM CHLORIDE 20 MEQ/1
20 TABLET, EXTENDED RELEASE ORAL EVERY MORNING
Status: ON HOLD | COMMUNITY
End: 2022-08-22

## 2022-08-18 RX ORDER — POLYETHYLENE GLYCOL 3350 17 G/17G
1 POWDER, FOR SOLUTION ORAL
Status: DISCONTINUED | OUTPATIENT
Start: 2022-08-18 | End: 2022-08-21

## 2022-08-18 RX ORDER — VITAMIN B COMPLEX
1000 TABLET ORAL 2 TIMES DAILY
Status: DISCONTINUED | OUTPATIENT
Start: 2022-08-19 | End: 2022-08-21

## 2022-08-18 RX ORDER — VIT A/VIT C/VIT E/ZINC/COPPER 2148-113
2 TABLET ORAL DAILY
COMMUNITY

## 2022-08-18 RX ORDER — DOXAZOSIN 2 MG/1
2-4 TABLET ORAL 2 TIMES DAILY
COMMUNITY

## 2022-08-18 RX ORDER — SODIUM CHLORIDE, SODIUM LACTATE, POTASSIUM CHLORIDE, CALCIUM CHLORIDE 600; 310; 30; 20 MG/100ML; MG/100ML; MG/100ML; MG/100ML
INJECTION, SOLUTION INTRAVENOUS CONTINUOUS
Status: DISCONTINUED | OUTPATIENT
Start: 2022-08-18 | End: 2022-08-20

## 2022-08-18 RX ADMIN — BUPIVACAINE HYDROCHLORIDE AND EPINEPHRINE 10 ML: 5; 5 INJECTION, SOLUTION EPIDURAL; INTRACAUDAL; PERINEURAL at 08:08

## 2022-08-18 RX ADMIN — OMEGA-3 FATTY ACIDS CAP 1000 MG 1000 MG: 1000 CAP at 17:59

## 2022-08-18 RX ADMIN — IOHEXOL 100 ML: 350 INJECTION, SOLUTION INTRAVENOUS at 07:35

## 2022-08-18 RX ADMIN — SIMVASTATIN 40 MG: 20 TABLET, FILM COATED ORAL at 20:22

## 2022-08-18 RX ADMIN — CLOSTRIDIUM TETANI TOXOID ANTIGEN (FORMALDEHYDE INACTIVATED), CORYNEBACTERIUM DIPHTHERIAE TOXOID ANTIGEN (FORMALDEHYDE INACTIVATED), BORDETELLA PERTUSSIS TOXOID ANTIGEN (GLUTARALDEHYDE INACTIVATED), BORDETELLA PERTUSSIS FILAMENTOUS HEMAGGLUTININ ANTIGEN (FORMALDEHYDE INACTIVATED), BORDETELLA PERTUSSIS PERTACTIN ANTIGEN, AND BORDETELLA PERTUSSIS FIMBRIAE 2/3 ANTIGEN 0.5 ML: 5; 2; 2.5; 5; 3; 5 INJECTION, SUSPENSION INTRAMUSCULAR at 08:16

## 2022-08-18 RX ADMIN — SODIUM CHLORIDE, POTASSIUM CHLORIDE, SODIUM LACTATE AND CALCIUM CHLORIDE: 600; 310; 30; 20 INJECTION, SOLUTION INTRAVENOUS at 10:25

## 2022-08-18 ASSESSMENT — PAIN DESCRIPTION - PAIN TYPE
TYPE: ACUTE PAIN
TYPE: ACUTE PAIN

## 2022-08-18 ASSESSMENT — PAIN SCALES - WONG BAKER
WONGBAKER_NUMERICALRESPONSE: DOESN'T HURT AT ALL

## 2022-08-18 ASSESSMENT — COGNITIVE AND FUNCTIONAL STATUS - GENERAL
SUGGESTED CMS G CODE MODIFIER MOBILITY: CJ
CLIMB 3 TO 5 STEPS WITH RAILING: A LOT
DRESSING REGULAR LOWER BODY CLOTHING: A LITTLE
TOILETING: A LITTLE
MOBILITY SCORE: 20
HELP NEEDED FOR BATHING: A LITTLE
SUGGESTED CMS G CODE MODIFIER DAILY ACTIVITY: CK
PERSONAL GROOMING: A LITTLE
DRESSING REGULAR UPPER BODY CLOTHING: A LITTLE
DAILY ACTIVITIY SCORE: 19
WALKING IN HOSPITAL ROOM: A LOT

## 2022-08-18 NOTE — H&P
"Hospital Medicine History & Physical Note    Date of Service  8/18/2022    Primary Care Physician  Cintia Hearn M.D.    Consultants  Dr. Martínez, Cardiology    Code Status  No Order    Chief Complaint  Chief Complaint   Patient presents with    GLF    Head Injury    ALOC       History of Presenting Illness  Francisco Stein is a 89 y.o. male who presented 8/18/2022 with GLF, Head Injury, and ALOC  He walks without assist device except when outside and patient had a normal day yesterday at his primary care doctor's office; he went to [facility], he went to socialize with his friends he was acting appropriately. He has a history of hypertension, hyperlipidemia, chronic murmur and CAD with stent. He has had no new medications currently, he is never passed out before, does not drink alcohol. This morning, he was going to the bathroom panning to shave, last seen by daughter [id number]. Daughter heard a thud and she went upstairs to find him on the floor, apparently hhitting L head and L arm. Daughter initially felt he was unconscious but responded shortly thereafter confused and garbled. After a minute he became \"unresponsive\" again and daughter felt there was \"seizure like activity\". EMS was called recommended CPR. When they arrived they continued CPR but patient was actually responsive and yelling. EMS telemetry revealed possible heart block.  At the ED, he is afebrile, normotensive.  C spine CT and maxillofacial CT XR L arm showed no evidence of injury  CTA HnN noted prox left ICA stenosis  CXR revealed trace L pleural effusion atelectasis vs consolidation  EKG junctional rhythm. Old EKG request being obtained for comparison.  No leukocytosis., Hb 9.4, , BUN 27, Cr- 1.29  Troponin 119 and BNP 8065  EDP sewed L head laceration  Dr. Martínez, Cardiology consulted. Preliminary thoughts per EDP was that the EKG readings did not correlate with his syncopal episodes.   When I saw him at ED, no acxute distress. Another daughter at " bedside and did say his mentation is back to baseline.    I discussed the plan of care with patient, family, and bedside RN.    Review of Systems  Review of Systems   Unable to perform ROS: Mental acuity     Past Medical History   has no past medical history on file.  Hypertension, dyslipidemia  Surgical History   has no past surgical history on file.     Family History  family history is not on file.   Daughter says his father had heart disease  Family history reviewed with patient. There is family history that is pertinent to the chief complaint.     Social History   reports that he has never smoked. He has never used smokeless tobacco. He reports that he does not drink alcohol and does not use drugs.    Allergies  No Known Allergies    Medications  Prior to Admission Medications   Prescriptions Last Dose Informant Patient Reported? Taking?   Non Formulary Request   Yes No   Sig: Take  by mouth 4 times a day. Vitamin for glaucoma    aspirin (ASA) 81 MG CHEW   Yes No   Sig: Take 81 mg by mouth every day.   docosahexanoic acid (OMEGA 3 FA) 1000 MG CAPS   Yes No   Sig: Take 1,000 mg by mouth 3 times a day, with meals.   hydrochlorothiazide (HYDRODIURIL) 25 MG TABS   Yes No   Sig: Take 25 mg by mouth every day.   metoprolol SR (TOPROL XL) 50 MG TB24   Yes No   Sig: Take 50 mg by mouth every day.   omeprazole (PRILOSEC) 20 MG CPDR   No No   Sig: Take 1 Cap by mouth every day.   simvastatin (ZOCOR) 40 MG TABS   Yes No   Sig: Take 40 mg by mouth every evening.   timolol (TIMOPTIC) 0.5 % SOLN   Yes No   Sig: Place 1 Drop in both eyes every day.   vitamin D (CHOLECALCIFEROL) 1000 UNIT TABS   Yes No   Sig: Take 1,000 Units by mouth 2 Times a Day.      Facility-Administered Medications: None       Physical Exam  Temp:  [36.2 °C (97.1 °F)] 36.2 °C (97.1 °F)  Pulse:  [59] 59  Resp:  [16] 16  BP: (127)/(64) 127/64  SpO2:  [90 %] 90 %  Blood Pressure : 127/64   Temperature: 36.2 °C (97.1 °F)   Pulse: (!) 59   Respiration: 16    Pulse Oximetry: 90 %       Physical Exam  Vitals and nursing note reviewed.   HENT:      Head: Normocephalic.      Comments: L sided lacersation and bruising, sutured, bandage/gauze     Right Ear: External ear normal.      Left Ear: External ear normal.      Nose: Nose normal.      Mouth/Throat:      Mouth: Mucous membranes are moist.   Eyes:      General: No scleral icterus.     Conjunctiva/sclera: Conjunctivae normal.   Cardiovascular:      Rate and Rhythm: Normal rate and regular rhythm.      Heart sounds: Murmur heard.     No friction rub. No gallop.   Pulmonary:      Effort: Pulmonary effort is normal.      Breath sounds: Normal breath sounds.   Abdominal:      General: Abdomen is flat. Bowel sounds are normal. There is no distension.      Palpations: Abdomen is soft.      Tenderness: There is no abdominal tenderness. There is no guarding.   Musculoskeletal:         General: Signs of injury (L arm skin tears) present. Normal range of motion.      Cervical back: Normal range of motion and neck supple.   Skin:     General: Skin is warm.   Neurological:      Mental Status: He is alert and oriented to person, place, and time. Mental status is at baseline.      Comments: Mild cognitive deficits   Psychiatric:         Mood and Affect: Mood normal.         Behavior: Behavior normal.         Thought Content: Thought content normal.         Judgment: Judgment normal.       Laboratory:  Recent Labs     08/18/22  0725   WBC 5.0   RBC 2.88*   HEMOGLOBIN 9.4*   HEMATOCRIT 29.0*   .7*   MCH 32.6   MCHC 32.4*   RDW 51.2*   PLATELETCT 121*   MPV 10.9     Recent Labs     08/18/22  0725   SODIUM 136   POTASSIUM 3.9   CHLORIDE 103   CO2 25   GLUCOSE 119*   BUN 27*   CREATININE 1.29   CALCIUM 8.1*     Recent Labs     08/18/22  0725   ALTSGPT 11   ASTSGOT 16   ALKPHOSPHAT 47   TBILIRUBIN 0.4   GLUCOSE 119*         Recent Labs     08/18/22  0725   NTPROBNP 8065*         Recent Labs     08/18/22  0725   TROPONINT 119*        Imaging:  DX-FOREARM LEFT   Final Result      1.  No evidence of acute fracture or dislocation.   2.  Degenerative changes.   3.  Mild soft tissue swelling.   4.  Atherosclerotic plaque.      DX-CHEST-PORTABLE (1 VIEW)   Final Result      1.  Small right and trace left pleural effusion with overlying atelectasis/consolidation.   2.  Cardiomegaly.   3.  Atherosclerotic plaque.      CT-CTA NECK WITH & W/O-POST PROCESSING   Final Result         1.  Severe stenosis of the proximal left internal carotid artery   2.  Small layering bilateral pleural effusions   3.  Mild mediastinal adenopathy, workup and evaluation for causes of adenopathy recommended as clinically appropriate.         CT-CTA HEAD WITH & W/O-POST PROCESS   Final Result         1.  No large vessel occlusion or aneurysm identified.      CT-MAXILLOFACIAL W/O PLUS RECONS   Final Result         1.  No acute traumatic facial bony injuries identified.      CT-CSPINE WITHOUT PLUS RECONS   Final Result         1.  Multilevel degenerative changes of the cervical spine limit diagnostic sensitivity of this examination, otherwise no acute traumatic bony injury of the cervical spine is apparent.   2.  Small layering bilateral pleural effusions            Assessment/Plan:  Justification for Admission Status  I anticipate this patient status to be inpatient as it will possibly take more than two midnights to evaluate his syncope, eval for seizures, telemetry for abnormal EKGs, PT/OT evaluation.    * Syncope, abnormal EKG/seizure like activity  Assessment & Plan  Ordered echo, telemetry, may need outpatient Holter/event monitor and referral to Cardiology for elective ischemic work-up  Ordered TSH  Ordered MRI, EEG and outpatient referal for R ICA stenosis if found symptomatic  Hold blood pressure meds, ordered orthostatics    Primary hypertension  Assessment & Plan  Vitals:    08/18/22 1000   BP: 137/65   Pulse: 62   Resp: 20   Temp:    SpO2: 93%     Stable  Hold  antihypertensives to eval syncope w/u    Dyslipidemia  Assessment & Plan  Statin    CAD (coronary artery disease)- (present on admission)  Assessment & Plan  ASA, statin      VTE prophylaxis: enoxaparin ppx but also monitor MRI r/o bleed

## 2022-08-18 NOTE — PROCEDURES
VIDEO ELECTROENCEPHALOGRAM REPORT      Referring provider: Dr. Martin    DOS: 08/18/22 (total recording of 21 minutes).     INDICATION:  Francisco Stein 89 y.o. male presenting with LOC     CURRENT ANTIEPILEPTIC REGIMEN: none     TECHNIQUE: 30 channel video electroencephalogram (EEG) was performed in accordance with the international 10-20 system. The study was reviewed in bipolar and referential montages. The recording examined the patient during   awake, drowsy and sleep states    DESCRIPTION OF THE RECORD:  During the wakefulness, the background showed a symmetrical 8 Hz alpha activity posteriorly with amplitude of 70 mV.  There was reactivity to eye closure/opening.  A normal anterior-posterior gradient was noted with faster beta frequencies seen anteriorly.  During drowsiness, increased theta/beta frequencies were seen.    During the sleep state,symmetrical sleep spindles and vertex sharps were seen in the leads over the central regions. No slow wave stage seen.     ACTIVATION PROCEDURES:     hyperventilation was not performed    Intermittent Photic stimulation was performed in a stepwise fashion from 1 to 30 Hz and elicited no photic driving response.     ICTAL AND/OR INTERICTAL FINDINGS:   No focal or generalized epileptiform activity noted. No regional slowing was seen during this routine study.  No clinical events or seizures were reported or recorded during the study.     EKG: sampling of the EKG recording demonstrated sinus rhythm and PVCs    EVENTS: none     INTERPRETATION:    This is a  normal video EEG recording in the awake, drowsy and sleep states.    Note: A normal EEG does not rule out epilepsy.  If the clinical suspicion remains high for seizures, a prolonged recording to capture clinical or subclinical events may be helpful.      Jack Ram MD  Diplomate in Neurology&Epilepsy  Office: [phone]  Fax: [phone]

## 2022-08-18 NOTE — ED TRIAGE NOTES
"Chief Complaint   Patient presents with    GLF    Head Injury    ALOC       BIB EMS to Charge Desk for a TBI. Pt coming in from home, had a GLF, pt's daughter started CPR. Per daughter pt was \"agonal breathing.\" Patient appears to have a large hematoma to the L forehead, bleeding present under his L eye with some abrasion/ possible lac. L elbow abrasion and forearm with scattered bruising. Pt is on ASA, unknown LOC, +head strike. Per pt's daughter pt's baseline is A/O x 4, pt is A/O x2-3 on arrival. Pt is also in a third degree block on 12 lead. EKG taken at charge desk. Pt is on 4L of oxygen NC. FSBS 136. Hx HTN. PIV in place. GCS 14.    Patient to CT with trauma RN.     /64   Pulse (!) 59   Resp 16   Ht 1.702 m (5' 7\")   Wt 83 kg (183 lb)   SpO2 90%   BMI 28.66 kg/m²    "

## 2022-08-18 NOTE — ED NOTES
The pt is alert and oriented sitting in bed. The pt remains on the monitor. Dressings are clean dry and intact. Vitals stable. Urinal given to the pt

## 2022-08-18 NOTE — ED PROVIDER NOTES
ED Provider Note    CHIEF COMPLAINT  Chief Complaint   Patient presents with    GLF    Head Injury    ALOC       HPI  Francisco Stein is a 89 y.o. male who presents to the emergency department via EMS after suffering ground-level fall and syncopal episode.  The patient was going to the restroom to shave and was last seen by his daughter at approximately 6:30 in the morning.  The patient was up in the bathroom and shaving and she heard a thud and she went upstairs and found him on the ground.  Initially the patient was not responding to him except for saying were MI and shortly thereafter became unresponsive.  She called 911 and they told her to start CPR even though he was still breathing.  She did CPR for approximately 3 minutes and EMS arrived.  When EMS arrived, they were yelling and the patient who now was hearing the EMS personnel was responding but was confused and saying where was he.  The patient currently denies loss of sensation or strength in arms or legs, fever, shakes, chills, sweats, nausea, vomiting, headache but does complain of slight chest discomfort.  The patient's daughter states that yesterday had a normal day, he went to the primary care doctor's office, he went to [facility], he went to socialize with his friends he was acting appropriately.  He has had no new medications currently, he is never passed out before, does not drink alcohol.  In addition, per EMS they believe the patient had third-degree AV block on the EKG yet had a normal blood pressure and normal heart rate    REVIEW OF SYSTEMS  Positives as above. Pertinent negatives include fever, nausea, vomiting, visual changes, hearing changes, lightness, dizziness, loss of sensation or strength arms or legs  All other 10 review of systems are negative    PAST MEDICAL HISTORY  History reviewed. No pertinent past medical history.    FAMILY HISTORY  Noncontributory    SOCIAL HISTORY  Social History     Socioeconomic History    Marital status:  "   Tobacco Use    Smoking status: Never    Smokeless tobacco: Never   Vaping Use    Vaping Use: Never used   Substance and Sexual Activity    Alcohol use: Never    Drug use: Never       SURGICAL HISTORY  History reviewed. No pertinent surgical history.    CURRENT MEDICATIONS  Home Medications       Reviewed by Sarina Carnes R.N. (Registered Nurse) on 08/18/22 at 0705  Med List Status: Unable to Obtain     Medication Last Dose Status   aspirin (ASA) 81 MG CHEW  Active   docosahexanoic acid (OMEGA 3 FA) 1000 MG CAPS  Active   hydrochlorothiazide (HYDRODIURIL) 25 MG TABS  Active   metoprolol SR (TOPROL XL) 50 MG TB24  Active   Non Formulary Request  Active   omeprazole (PRILOSEC) 20 MG CPDR  Active   simvastatin (ZOCOR) 40 MG TABS  Active   timolol (TIMOPTIC) 0.5 % SOLN  Active   vitamin D (CHOLECALCIFEROL) 1000 UNIT TABS  Active                    ALLERGIES  No Known Allergies    PHYSICAL EXAM  VITAL SIGNS: /64   Pulse (!) 59   Resp 16   Ht 1.702 m (5' 7\")   Wt 83 kg (183 lb)   SpO2 90%   BMI 28.66 kg/m²      Constitutional: Well developed, Well nourished, No acute distress, Non-toxic appearance.   HENT: 1.5 cm lacerations avulsion injury to the left cheek area, no eye involvement slight active bleeding, tenderness on palpation to the nose as well as the left temporal region, no dental trauma  Neck: In cervical spine collar, slight midline cervical spine tenderness, no step-off deformity  Eyes: PERRLA, EOMI, Conjunctiva normal, No discharge.   Cardiovascular: Irregular rhythm, No murmurs, No rubs, No gallops, and intact distal pulses.   Thorax & Lungs:  No respiratory distress, no rales, no rhonchi, No wheezing, No chest wall tenderness.   Abdomen: Bowel sounds normal, Soft, No tenderness, No guarding, No rebound, No pulsatile masses.   Skin: Ecchymosis bilateral upper extremities, 1.5 cm laceration to the left temporal region   extremities: Full range of motion, no deformity, no " edema.  Neurologic: Alert & oriented x 2, no pronator drift, leg lift 5/5 bilaterally, sensation intact throughout, negative finger-nose bilaterally,   Psychiatric: Affect normal for clinical presentation.                    LABORATORY/ECG  Results for orders placed or performed during the hospital encounter of 08/18/22   CBC w/ Differential   Result Value Ref Range    WBC 5.0 4.8 - 10.8 K/uL    RBC 2.88 (L) 4.70 - 6.10 M/uL    Hemoglobin 9.4 (L) 14.0 - 18.0 g/dL    Hematocrit 29.0 (L) 42.0 - 52.0 %    .7 (H) 81.4 - 97.8 fL    MCH 32.6 27.0 - 33.0 pg    MCHC 32.4 (L) 33.7 - 35.3 g/dL    RDW 51.2 (H) 35.9 - 50.0 fL    Platelet Count 121 (L) 164 - 446 K/uL    MPV 10.9 9.0 - 12.9 fL    Neutrophils-Polys 61.30 44.00 - 72.00 %    Lymphocytes 27.90 22.00 - 41.00 %    Monocytes 8.60 0.00 - 13.40 %    Eosinophils 1.60 0.00 - 6.90 %    Basophils 0.40 0.00 - 1.80 %    Immature Granulocytes 0.20 0.00 - 0.90 %    Nucleated RBC 0.00 /100 WBC    Neutrophils (Absolute) 3.08 1.82 - 7.42 K/uL    Lymphs (Absolute) 1.40 1.00 - 4.80 K/uL    Monos (Absolute) 0.43 0.00 - 0.85 K/uL    Eos (Absolute) 0.08 0.00 - 0.51 K/uL    Baso (Absolute) 0.02 0.00 - 0.12 K/uL    Immature Granulocytes (abs) 0.01 0.00 - 0.11 K/uL    NRBC (Absolute) 0.00 K/uL   Complete Metabolic Panel (CMP)   Result Value Ref Range    Sodium 136 135 - 145 mmol/L    Potassium 3.9 3.6 - 5.5 mmol/L    Chloride 103 96 - 112 mmol/L    Co2 25 20 - 33 mmol/L    Anion Gap 8.0 7.0 - 16.0    Glucose 119 (H) 65 - 99 mg/dL    Bun 27 (H) 8 - 22 mg/dL    Creatinine 1.29 0.50 - 1.40 mg/dL    Calcium 8.1 (L) 8.5 - 10.5 mg/dL    AST(SGOT) 16 12 - 45 U/L    ALT(SGPT) 11 2 - 50 U/L    Alkaline Phosphatase 47 30 - 99 U/L    Total Bilirubin 0.4 0.1 - 1.5 mg/dL    Albumin 3.1 (L) 3.2 - 4.9 g/dL    Total Protein 5.3 (L) 6.0 - 8.2 g/dL    Globulin 2.2 1.9 - 3.5 g/dL    A-G Ratio 1.4 g/dL   proBrain Natriuretic Peptide, NT   Result Value Ref Range    NT-proBNP 8065 (H) 0 - 125 pg/mL    Troponin STAT   Result Value Ref Range    Troponin T 119 (H) 6 - 19 ng/L   Magnesium   Result Value Ref Range    Magnesium 1.8 1.5 - 2.5 mg/dL   Phosphorus   Result Value Ref Range    Phosphorus 3.3 2.5 - 4.5 mg/dL   ESTIMATED GFR   Result Value Ref Range    GFR (CKD-EPI) 52 (A) >60 mL/min/1.73 m 2   EKG   Result Value Ref Range    Report       [facility] Emergency Dept.    Test Date:  2022  Pt Name:    HEAVEN MUNIZ                Department: ER  MRN:        [id number]                      Room:        17  Gender:     Male                         Technician: [id number]  :        [id number]                   Requested By:ER TRIAGE PROTOCOL  Order #:    [id number]                    Reading MD: LIBIA PASTRANA DO    Measurements  Intervals                                Axis  Rate:       58                           P:  CT:                                      QRS:        -68  QRSD:       190                          T:          111  QT:         533  QTc:        524    Interpretive Statements  Junctional rhythm  Nonspecific IVCD with LAD  LVH with secondary repolarization abnormality  Probable inferior infarct, acute  Anterolateral infarct, old  No previous ECG available for comparison  Electronically Signed On 2022 7:10:08 PDT by LIBIA PASTRANA DO           RADIOLOGY/PROCEDURES  DX-FOREARM LEFT   Final Result      1.  No evidence of acute fracture or dislocation.   2.  Degenerative changes.   3.  Mild soft tissue swelling.   4.  Atherosclerotic plaque.      DX-CHEST-PORTABLE (1 VIEW)   Final Result      1.  Small right and trace left pleural effusion with overlying atelectasis/consolidation.   2.  Cardiomegaly.   3.  Atherosclerotic plaque.      CT-CTA NECK WITH & W/O-POST PROCESSING   Final Result         1.  Severe stenosis of the proximal left internal carotid artery   2.  Small layering bilateral pleural effusions   3.  Mild mediastinal adenopathy, workup and evaluation  for causes of adenopathy recommended as clinically appropriate.         CT-CTA HEAD WITH & W/O-POST PROCESS   Final Result         1.  No large vessel occlusion or aneurysm identified.      CT-MAXILLOFACIAL W/O PLUS RECONS   Final Result         1.  No acute traumatic facial bony injuries identified.      CT-CSPINE WITHOUT PLUS RECONS   Final Result         1.  Multilevel degenerative changes of the cervical spine limit diagnostic sensitivity of this examination, otherwise no acute traumatic bony injury of the cervical spine is apparent.   2.  Small layering bilateral pleural effusions        Laceration Repair Procedure Note    Indication: Laceration    Procedure: The patient was placed in the appropriate position and anesthesia around the laceration was obtained by infiltration using 0.5% Bupivacaine with epinephrine. The area was then cleansed using sterile fluid. The laceration was closed in two layers. The subcutaneous layer was closed with 5-0 Vicryl using interrupted sutures. The skin was closed with 6-0 Ethilon using interrupted sutures. There were no additional lacerations requiring repair. The wound area was then dressed with bacitracin.      Total repaired wound length: 3.5 cm.     Other Items: Suture count: 7 external 3 internal    The patient tolerated the procedure well.    Complications: None    The skin tear in the arm was cleaned and Steri-Stripped per nursing staff.    COURSE & MEDICAL DECISION MAKING  Pertinent Labs & Imaging studies reviewed. (See chart for details)  This is a pleasant 89-year gentleman presents after having a syncopal episode and ground-level fall resulting in significant laceration to his left side of his face.  Head CT was negative for acute intracranial hemorrhage, CT scan face negative for fracture and CT cervical spine is negative as well.  The patient had a left arm injury and negative for fracture.  Here in the emergency department, the patient has no symptom EKG changes,  he has no evidence of focal neural logical deficit, he has no evidence of sepsis.  In addition, the patient has no focal neurological deficits at the disposition for CVA, TIA currently.  He was altered initially therefore CTA head and neck were completed were negative for LVO.  I am unsure the patient's etiology of his syncope but I do believe he requires hospitalization.  For this reason, I discussed the patient with Dr. Thurman who graciously excepts hospitalization of this patient.      FINAL IMPRESSION  Syncope  Altered mental status  Facial laceration  Arm skin tear  Left arm contusion  Closed head injury       Electronically signed by: Mick Sol D.O., 8/18/2022 7:19 AM

## 2022-08-18 NOTE — ED NOTES
The pt is laying in bed with eyes closed. Breathing is even and unlabored. Vitals stable. The pt remains on the monitor.

## 2022-08-18 NOTE — CONSULTS
Cardiology Consult Note:    Montana Martínez M.D.  Date & Time note created:    8/18/2022   5:20 PM     Referring MD:  Dr. Thurman    Patient ID:   Name:             Francisco Stein     YOB: 1931  Age:                 89 y.o.  male   MRN:               [id number]                                                             Chief Complaint / Reason for consult:  Syncope with baseline LBBB.    History of Present Illness:    This is a 89 years old man with prior history of remote coronary stenting (RCA) with Dr. Sands, hypertension, hyperlipidemia, presented to the hospital via EMS because of a true syncopal episode at home.  Patient had complete loss of consciousness.  EKG is found to have baseline left bundle branch block with sinus rhythm.  I personally interpreted the EKG tracing.    No chest pain at this time. No dyspnea.    Troponin T of 119, NT pro BNP of 8065. I personally interpreted the blood tests.    Review of Systems:      Constitutional: Denies fevers, Denies weight changes  Eyes: Denies changes in vision, no eye pain  Ears/Nose/Throat/Mouth: Denies nasal congestion or sore throat   Cardiovascular: no chest pain, no palpitations   Respiratory: no shortness of breath , Denies cough  Gastrointestinal/Hepatic: Denies abdominal pain, nausea, vomiting, diarrhea, constipation or GI bleeding   Genitourinary: Denies dysuria or frequency  Musculoskeletal/Rheum: Denies  joint pain and swelling   Skin: Denies rash  Neurological: Denies headache, confusion, memory loss or focal weakness/parasthesias  Psychiatric: denies mood disorder   Endocrine: Marilyn thyroid problems  Heme/Oncology/Lymph Nodes: Denies enlarged lymph nodes, denies brusing or known bleeding disorder  All other systems were reviewed and are negative (AMA/CMS criteria)                Past Medical History:   Past Medical History:   Diagnosis Date    Left bundle branch block 8/18/2022     Active Hospital Problems    Diagnosis      Syncope, abnormal EKG/seizure like activity [R55]     Primary hypertension [I10]     Stenosis of right carotid artery [I65.21]     Syncope and collapse [R55]     Left bundle branch block [I44.7]     CAD (coronary artery disease) [I25.10]     Dyslipidemia [E78.5]        Past Surgical History:  History reviewed. No pertinent surgical history.    Hospital Medications:    Current Facility-Administered Medications:     [START ON 8/19/2022] aspirin (ASA) chewable tab 81 mg, 81 mg, Oral, DAILY, Domingo Thurman M.D.    fish oil capsule 1,000 mg, 1,000 mg, Oral, TID WITH MEALS, Domingo Thurman M.D.    [START ON 8/19/2022] omeprazole (PRILOSEC) capsule 20 mg, 20 mg, Oral, DAILY, Domingo Thurman M.D.    simvastatin (ZOCOR) tablet 40 mg, 40 mg, Oral, Nightly, Domingo Thurman M.D.    [START ON 8/19/2022] timolol (TIMOPTIC) 0.5 % ophthalmic solution 1 Drop, 1 Drop, Both Eyes, DAILY, Domingo Thurman M.D.    [START ON 8/19/2022] vitamin D3 (cholecalciferol) tablet 1,000 Units, 1,000 Units, Oral, BID, Domingo Thurman M.D.    [START ON 8/19/2022] senna-docusate (PERICOLACE or SENOKOT S) 8.6-50 MG per tablet 2 Tablet, 2 Tablet, Oral, BID **AND** polyethylene glycol/lytes (MIRALAX) PACKET 1 Packet, 1 Packet, Oral, QDAY PRN **AND** magnesium hydroxide (MILK OF MAGNESIA) suspension 30 mL, 30 mL, Oral, QDAY PRN **AND** bisacodyl (DULCOLAX) suppository 10 mg, 10 mg, Rectal, QDAY PRN, Domingo Thurman M.D.    lactated ringers infusion, , Intravenous, Continuous, Domingo Thurman M.D., Last Rate: 75 mL/hr at 08/18/22 1025, New Bag at 08/18/22 1025    acetaminophen (Tylenol) tablet 650 mg, 650 mg, Oral, Q6HRS PRN, Domingo Thurman M.D.    [START ON 8/19/2022] enoxaparin (Lovenox) inj 40 mg, 40 mg, Subcutaneous, DAILY AT 1800, Domingo Thurman M.D.    Current Outpatient Medications:  Medications Prior to Admission   Medication Sig Dispense Refill Last Dose    furosemide (LASIX) 20 MG Tab Take 20 mg by mouth every morning.   UNK  at [facility]    potassium chloride SA (KDUR) 20 MEQ Tab CR Take 20 mEq by mouth every morning.   UNK at [facility]    Multiple Vitamins-Minerals (CENTRUM SILVER ADULT 50+) Tab Take 1 Tablet by mouth every morning.   UNK at [facility]    Multiple Vitamins-Minerals (PRESERVISION AREDS) Tab Take 2 Tablets by mouth every day.   UNK at [facility]    Krill Oil 300 MG Cap Take 300 mg by mouth every morning.   UNK at [facility]    DILTIAZem CD (CARDIZEM CD) 120 MG CAPSULE SR 24 HR Take 120 mg by mouth every morning.   UNK at [facility]    lisinopril (PRINIVIL) 40 MG tablet Take 20-40 mg by mouth 2 times a day. 40 mg in the morning & 20 mg (1/2 tablet) in the evening   UNK at [facility]    atorvastatin (LIPITOR) 40 MG Tab Take 40 mg by mouth at bedtime.   UNK at [facility]    doxazosin (CARDURA) 2 MG Tab Take 2-4 mg by mouth 2 times a day. 2 mg in the morning & 4 mg in the evening   UNK at [facility]    Docusate Calcium (STOOL SOFTENER PO) Take 2 Capsules by mouth every day.   UNK at [facility]    latanoprost (XALATAN) 0.005 % Solution Administer 1 Drop into the left eye every evening.   UNK at [facility]    levothyroxine (SYNTHROID) 50 MCG Tab Take 50 mcg by mouth every morning on an empty stomach.   UNK at [facility]    Ferrous Sulfate Dried (HIGH POTENCY IRON) 65 MG Tab Take 65 mg by mouth 3 times a week.   UNK at [facility]    omeprazole (PRILOSEC) 20 MG CPDR Take 1 Cap by mouth every day. 30 Cap 0 UNK at [facility]    aspirin (ASA) 81 MG CHEW Chew 81 mg every evening.   UNK at [facility]    metoprolol SR (TOPROL XL) 50 MG TB24 Take 50 mg by mouth every morning.   UNK at [facility]    timolol (TIMOPTIC) 0.5 % SOLN Administer 1 Drop into the left eye 2 times a day.   UNK at [facility]       Medication Allergy:  No Known Allergies    Family History:  History reviewed. No pertinent family history.    Social History:  Social History     Socioeconomic History    Marital status:      Spouse name: Not on file    Number of children: Not on file    Years of education: Not on file    Highest education level: Not on file   Occupational  "History    Not on file   Tobacco Use    Smoking status: Never    Smokeless tobacco: Never   Vaping Use    Vaping Use: Never used   Substance and Sexual Activity    Alcohol use: Never    Drug use: Never    Sexual activity: Not on file   Other Topics Concern    Not on file   Social History Narrative    Not on file     Social Determinants of Health     Financial Resource Strain: Not on file   Food Insecurity: Not on file   Transportation Needs: Not on file   Physical Activity: Not on file   Stress: Not on file   Social Connections: Not on file   Intimate Partner Violence: Not on file   Housing Stability: Not on file         Physical Exam:  Vitals/ General Appearance:   Weight/BMI: Body mass index is 28.66 kg/m².  BP (!) 156/71   Pulse 77   Temp 36.2 °C (97.1 °F) (Temporal)   Resp 20   Ht 1.702 m (5' 7\")   Wt 83 kg (183 lb)   SpO2 92%   Vitals:    08/18/22 1300 08/18/22 1400 08/18/22 1500 08/18/22 1601   BP: (!) 141/70 (!) 147/74 (!) 143/68 (!) 156/71   Pulse: 72 72 77 77   Resp: 20 16 20 20   Temp:       TempSrc:       SpO2: 91% 91% 92% 92%   Weight:       Height:         Oxygen Therapy:  Pulse Oximetry: 92 %, O2 (LPM): 0, O2 Delivery Device: None - Room Air    Constitutional:   No acute distress  HENMT:  Normocephalic, Atraumatic.  Eyes:  EOMI, No discharge.  Neck:  no JVD.  Cardiovascular:  Normal heart rate, Normal rhythm.  Extremitites with intact distal pulses, no cyanosis, or edema.  Lungs:  No respiratory distress.  Abdomen: Soft, No tenderness, No guarding, No rebound.  Skin: No significant rash.  Neurologic: Alert & oriented x 3, No focal deficits noted, cranial nerves II through X are intact.  Psychiatric: Affect normal, Judgment normal, Mood normal.      MDM (Data Review):     Records reviewed and summarized in current documentation    Lab Data Review:  Recent Results (from the past 24 hour(s))   EKG    Collection Time: 08/18/22  7:00 AM   Result Value Ref Range    Report       [facility] " [place] [facility]    Test Date:  2022  Pt Name:    HEAVEN MUNIZ                Department: ER  MRN:        [id number]                      Room:       BL 17  Gender:     Male                         Technician: [id number]  :        [id number]                   Requested By:ER TRIAGE PROTOCOL  Order #:    [id number]                    Reading MD: LIBIA PASTRANA DO    Measurements  Intervals                                Axis  Rate:       58                           P:  MS:                                      QRS:        -68  QRSD:       190                          T:          111  QT:         533  QTc:        524    Interpretive Statements  Junctional rhythm  Nonspecific IVCD with LAD  LVH with secondary repolarization abnormality  Probable inferior infarct, acute  Anterolateral infarct, old  No previous ECG available for comparison  Electronically Signed On 2022 7:10:08 PDT by LIBIA PASTRANA DO     CBC w/ Differential    Collection Time: 22  7:25 AM   Result Value Ref Range    WBC 5.0 4.8 - 10.8 K/uL    RBC 2.88 (L) 4.70 - 6.10 M/uL    Hemoglobin 9.4 (L) 14.0 - 18.0 g/dL    Hematocrit 29.0 (L) 42.0 - 52.0 %    .7 (H) 81.4 - 97.8 fL    MCH 32.6 27.0 - 33.0 pg    MCHC 32.4 (L) 33.7 - 35.3 g/dL    RDW 51.2 (H) 35.9 - 50.0 fL    Platelet Count 121 (L) 164 - 446 K/uL    MPV 10.9 9.0 - 12.9 fL    Neutrophils-Polys 61.30 44.00 - 72.00 %    Lymphocytes 27.90 22.00 - 41.00 %    Monocytes 8.60 0.00 - 13.40 %    Eosinophils 1.60 0.00 - 6.90 %    Basophils 0.40 0.00 - 1.80 %    Immature Granulocytes 0.20 0.00 - 0.90 %    Nucleated RBC 0.00 /100 WBC    Neutrophils (Absolute) 3.08 1.82 - 7.42 K/uL    Lymphs (Absolute) 1.40 1.00 - 4.80 K/uL    Monos (Absolute) 0.43 0.00 - 0.85 K/uL    Eos (Absolute) 0.08 0.00 - 0.51 K/uL    Baso (Absolute) 0.02 0.00 - 0.12 K/uL    Immature Granulocytes (abs) 0.01 0.00 - 0.11 K/uL    NRBC (Absolute) 0.00 K/uL   Complete Metabolic Panel (CMP)    Collection  Time: 08/18/22  7:25 AM   Result Value Ref Range    Sodium 136 135 - 145 mmol/L    Potassium 3.9 3.6 - 5.5 mmol/L    Chloride 103 96 - 112 mmol/L    Co2 25 20 - 33 mmol/L    Anion Gap 8.0 7.0 - 16.0    Glucose 119 (H) 65 - 99 mg/dL    Bun 27 (H) 8 - 22 mg/dL    Creatinine 1.29 0.50 - 1.40 mg/dL    Calcium 8.1 (L) 8.5 - 10.5 mg/dL    AST(SGOT) 16 12 - 45 U/L    ALT(SGPT) 11 2 - 50 U/L    Alkaline Phosphatase 47 30 - 99 U/L    Total Bilirubin 0.4 0.1 - 1.5 mg/dL    Albumin 3.1 (L) 3.2 - 4.9 g/dL    Total Protein 5.3 (L) 6.0 - 8.2 g/dL    Globulin 2.2 1.9 - 3.5 g/dL    A-G Ratio 1.4 g/dL   proBrain Natriuretic Peptide, NT    Collection Time: 08/18/22  7:25 AM   Result Value Ref Range    NT-proBNP 8065 (H) 0 - 125 pg/mL   Troponin STAT    Collection Time: 08/18/22  7:25 AM   Result Value Ref Range    Troponin T 119 (H) 6 - 19 ng/L   Magnesium    Collection Time: 08/18/22  7:25 AM   Result Value Ref Range    Magnesium 1.8 1.5 - 2.5 mg/dL   Phosphorus    Collection Time: 08/18/22  7:25 AM   Result Value Ref Range    Phosphorus 3.3 2.5 - 4.5 mg/dL   ESTIMATED GFR    Collection Time: 08/18/22  7:25 AM   Result Value Ref Range    GFR (CKD-EPI) 52 (A) >60 mL/min/1.73 m 2   D-Dimer    Collection Time: 08/18/22  7:25 AM   Result Value Ref Range    D-Dimer Screen 1.77 (H) 0.00 - 0.50 ug/mL (FEU)   PROLACTIN    Collection Time: 08/18/22  7:25 AM   Result Value Ref Range    Prolactin 43.10 (H) 2.10 - 17.70 ng/mL   CREATINE KINASE    Collection Time: 08/18/22  7:25 AM   Result Value Ref Range    CPK Total 51 0 - 154 U/L       Imaging/Procedures Review:    Chest Xray:  Reviewed    EKG:   As in HPI.     MDM (Assessment and Plan):     Active Hospital Problems    Diagnosis     Syncope, abnormal EKG/seizure like activity [R55]     Primary hypertension [I10]     Stenosis of right carotid artery [I65.21]     Syncope and collapse [R55]     Left bundle branch block [I44.7]     CAD (coronary artery disease) [I25.10]     Dyslipidemia [E78.5]           At this time, due to prior history of coronary tear disease with PCI, patient is indicated to undergo further invasive cardiac work-up with coronary angiogram to further delineate coronary anatomy along with prior stent.  In the meantime, continue neurology work-up for possible epilepsy.  I will order transthoracic echocardiogram to assess for structural abnormalities.        Thank you for referring this patient to our cardiology service.  We will follow patient with you.      Montana aMrtínez MD.   Cardiology Inpatient Service.  [facility].  [phone].  Keturah Rivero.

## 2022-08-18 NOTE — ED NOTES
Pt switched to hospital bed. The pt ambulated steadily and with assistance to chair then bed. Vitals stable. monitor applied once transfer complete

## 2022-08-19 ENCOUNTER — APPOINTMENT (OUTPATIENT)
Dept: CARDIOLOGY | Facility: MEDICAL CENTER | Age: 87
DRG: 987 | End: 2022-08-19
Attending: INTERNAL MEDICINE
Payer: MEDICARE

## 2022-08-19 ENCOUNTER — APPOINTMENT (OUTPATIENT)
Dept: CARDIOLOGY | Facility: MEDICAL CENTER | Age: 87
DRG: 987 | End: 2022-08-19
Attending: NURSE PRACTITIONER
Payer: MEDICARE

## 2022-08-19 LAB
ANION GAP SERPL CALC-SCNC: 14 MMOL/L (ref 7–16)
BUN SERPL-MCNC: 24 MG/DL (ref 8–22)
CALCIUM SERPL-MCNC: 9 MG/DL (ref 8.5–10.5)
CHLORIDE SERPL-SCNC: 100 MMOL/L (ref 96–112)
CO2 SERPL-SCNC: 22 MMOL/L (ref 20–33)
CREAT SERPL-MCNC: 1.06 MG/DL (ref 0.5–1.4)
ERYTHROCYTE [DISTWIDTH] IN BLOOD BY AUTOMATED COUNT: 49.5 FL (ref 35.9–50)
GFR SERPLBLD CREATININE-BSD FMLA CKD-EPI: 66 ML/MIN/1.73 M 2
GLUCOSE SERPL-MCNC: 113 MG/DL (ref 65–99)
HCT VFR BLD AUTO: 31.6 % (ref 42–52)
HGB BLD-MCNC: 10.4 G/DL (ref 14–18)
LV EJECT FRACT  99904: 30
LV EJECT FRACT MOD 2C 99903: 57.28
LV EJECT FRACT MOD 4C 99902: 41.13
LV EJECT FRACT MOD BP 99901: 51.74
MCH RBC QN AUTO: 32.8 PG (ref 27–33)
MCHC RBC AUTO-ENTMCNC: 32.9 G/DL (ref 33.7–35.3)
MCV RBC AUTO: 99.7 FL (ref 81.4–97.8)
PLATELET # BLD AUTO: 130 K/UL (ref 164–446)
PMV BLD AUTO: 11.2 FL (ref 9–12.9)
POTASSIUM SERPL-SCNC: 4 MMOL/L (ref 3.6–5.5)
RBC # BLD AUTO: 3.17 M/UL (ref 4.7–6.1)
SODIUM SERPL-SCNC: 136 MMOL/L (ref 135–145)
WBC # BLD AUTO: 5.6 K/UL (ref 4.8–10.8)

## 2022-08-19 PROCEDURE — 700102 HCHG RX REV CODE 250 W/ 637 OVERRIDE(OP): Performed by: INTERNAL MEDICINE

## 2022-08-19 PROCEDURE — 85027 COMPLETE CBC AUTOMATED: CPT

## 2022-08-19 PROCEDURE — 93306 TTE W/DOPPLER COMPLETE: CPT | Mod: 26 | Performed by: INTERNAL MEDICINE

## 2022-08-19 PROCEDURE — 70551 MRI BRAIN STEM W/O DYE: CPT

## 2022-08-19 PROCEDURE — 99233 SBSQ HOSP IP/OBS HIGH 50: CPT | Mod: FS | Performed by: INTERNAL MEDICINE

## 2022-08-19 PROCEDURE — 99233 SBSQ HOSP IP/OBS HIGH 50: CPT | Performed by: HOSPITALIST

## 2022-08-19 PROCEDURE — 700105 HCHG RX REV CODE 258: Performed by: INTERNAL MEDICINE

## 2022-08-19 PROCEDURE — 770020 HCHG ROOM/CARE - TELE (206)

## 2022-08-19 PROCEDURE — 36415 COLL VENOUS BLD VENIPUNCTURE: CPT

## 2022-08-19 PROCEDURE — A9270 NON-COVERED ITEM OR SERVICE: HCPCS | Performed by: INTERNAL MEDICINE

## 2022-08-19 PROCEDURE — 80048 BASIC METABOLIC PNL TOTAL CA: CPT

## 2022-08-19 PROCEDURE — 93306 TTE W/DOPPLER COMPLETE: CPT

## 2022-08-19 PROCEDURE — 700101 HCHG RX REV CODE 250: Performed by: INTERNAL MEDICINE

## 2022-08-19 RX ORDER — LOSARTAN POTASSIUM 25 MG/1
25 TABLET ORAL
Status: DISCONTINUED | OUTPATIENT
Start: 2022-08-19 | End: 2022-08-21

## 2022-08-19 RX ADMIN — OMEPRAZOLE 20 MG: 20 CAPSULE, DELAYED RELEASE ORAL at 06:09

## 2022-08-19 RX ADMIN — DOCUSATE SODIUM 50 MG AND SENNOSIDES 8.6 MG 2 TABLET: 8.6; 5 TABLET, FILM COATED ORAL at 06:09

## 2022-08-19 RX ADMIN — ASPIRIN 81 MG: 81 TABLET, CHEWABLE ORAL at 06:09

## 2022-08-19 RX ADMIN — SIMVASTATIN 40 MG: 20 TABLET, FILM COATED ORAL at 20:28

## 2022-08-19 RX ADMIN — Medication 1000 UNITS: at 17:04

## 2022-08-19 RX ADMIN — OMEGA-3 FATTY ACIDS CAP 1000 MG 1000 MG: 1000 CAP at 17:04

## 2022-08-19 RX ADMIN — DOCUSATE SODIUM 50 MG AND SENNOSIDES 8.6 MG 2 TABLET: 8.6; 5 TABLET, FILM COATED ORAL at 17:04

## 2022-08-19 RX ADMIN — Medication 1000 UNITS: at 06:09

## 2022-08-19 RX ADMIN — SODIUM CHLORIDE, POTASSIUM CHLORIDE, SODIUM LACTATE AND CALCIUM CHLORIDE: 600; 310; 30; 20 INJECTION, SOLUTION INTRAVENOUS at 01:13

## 2022-08-19 RX ADMIN — TIMOLOL MALEATE 1 DROP: 5 SOLUTION OPHTHALMIC at 06:10

## 2022-08-19 ASSESSMENT — ENCOUNTER SYMPTOMS
DIZZINESS: 1
CHILLS: 0
COUGH: 0
VOMITING: 0
HEADACHES: 0
NAUSEA: 0
LIGHT-HEADEDNESS: 0
SHORTNESS OF BREATH: 0
FATIGUE: 0
ABDOMINAL PAIN: 0
PALPITATIONS: 0
FALLS: 1
FEVER: 0
CHEST TIGHTNESS: 0
WEAKNESS: 0
DIZZINESS: 0

## 2022-08-19 ASSESSMENT — PATIENT HEALTH QUESTIONNAIRE - PHQ9
SUM OF ALL RESPONSES TO PHQ9 QUESTIONS 1 AND 2: 0
2. FEELING DOWN, DEPRESSED, IRRITABLE, OR HOPELESS: NOT AT ALL
1. LITTLE INTEREST OR PLEASURE IN DOING THINGS: NOT AT ALL

## 2022-08-19 ASSESSMENT — PAIN DESCRIPTION - PAIN TYPE: TYPE: ACUTE PAIN

## 2022-08-19 NOTE — HOSPITAL COURSE
89-year-old male with past medical history of hypertension, CAD s/p PCI, DL D presenting after ground-level fall with some seizure-like activity.  Telemetry and EMS had revealed possible heart block.  CT maxillofacial and C-spine showed no evidence of injury.  CTA of head and neck showed proximal left ICA stenosis.  Patient did have a left hand laceration which was sutured by ERP.  EEG did not show any seizure-like activity.  MRI brain showed left scalp hematoma.     He was noted to have a troponin of 119 and cardiology was consulted and patient will be proceeding with a left heart cath.

## 2022-08-19 NOTE — WOUND TEAM
Wound team consulted for left forearm skin tear and head laceration. Dressing orders for skin tears placed. Head laceration was repaired by ER physician - please leave open to air unless draining as patient is receiving bacitracin to area.   
normal...

## 2022-08-19 NOTE — CARE PLAN
The patient is Watcher - Medium risk of patient condition declining or worsening    Shift Goals  Clinical Goals: safety  Patient Goals: rest  Family Goals: rest    Progress made toward(s) clinical / shift goals:  Uses call light appropriately, able to verbalize needs.     Patient is not progressing towards the following goals: N/A

## 2022-08-19 NOTE — CARE PLAN
The patient is Stable - Low risk of patient condition declining or worsening    Shift Goals  Clinical Goals: rest  Patient Goals: rest  Family Goals: rest    Progress made toward(s) clinical / shift goals:  Pt had fall at home, so all fall precautions in place for high risk patient. Pt verbalized understanding of how to use call light and how alarms will go off if he exits the bed.    Patient is not progressing towards the following goals: Recent fall

## 2022-08-19 NOTE — PROGRESS NOTES
Cath lab called and stated pt is not going to cath lab today. He will be NPO at midnight and Cath is set for tomorrow. Provider and Family made aware.

## 2022-08-19 NOTE — PROGRESS NOTES
Hospital Medicine Daily Progress Note    Date of Service  8/19/2022    Chief Complaint  Francisco Stein is a 89 y.o. male admitted 8/18/2022 with GLF    Hospital Course  89-year-old male with past medical history of hypertension, CAD s/p PCI, DL D presenting after ground-level fall with some seizure-like activity.  Telemetry and EMS had revealed possible heart block.  CT maxillofacial and C-spine showed no evidence of injury.  CTA of head and neck showed proximal left ICA stenosis.  Patient did have a left hand laceration which was sutured by ERP.  EEG did not show any seizure-like activity.  MRI brain showed left scalp hematoma.     He was noted to have a troponin of 119 and cardiology was consulted and patient will be proceeding with a left heart cath.    Interval Problem Update  No acute events overnight  He is feeling well without any complaints      I have discussed this patient's plan of care and discharge plan at IDT rounds today with Case Management, Nursing, Nursing leadership, and other members of the IDT team.    Consultants/Specialty  cardiology    Code Status  Full Code    Disposition  Patient is not medically cleared for discharge.   Anticipate discharge to  tbd .  I have placed the appropriate orders for post-discharge needs.    Review of Systems  Review of Systems   Constitutional:  Negative for chills and fever.   Respiratory:  Negative for cough and shortness of breath.    Cardiovascular:  Negative for chest pain and palpitations.   Gastrointestinal:  Negative for abdominal pain, nausea and vomiting.   Genitourinary:  Negative for dysuria and urgency.   Musculoskeletal:  Positive for falls.   Neurological:  Negative for dizziness and headaches.   All other systems reviewed and are negative.     Physical Exam  Temp:  [36.4 °C (97.6 °F)-37.3 °C (99.1 °F)] 37 °C (98.6 °F)  Pulse:  [77-95] 95  Resp:  [16-20] 16  BP: (137-156)/(67-81) 143/78  SpO2:  [90 %-94 %] 90 %    Physical Exam  Vitals and nursing  note reviewed.   Constitutional:       Appearance: Normal appearance.   Eyes:      Comments: Periorbital ecchymosis of left eye, barely able to open eye   Cardiovascular:      Rate and Rhythm: Normal rate and regular rhythm.      Heart sounds: No murmur heard.     Comments: Bruising on chest  Pulmonary:      Effort: Pulmonary effort is normal. No respiratory distress.      Breath sounds: Normal breath sounds.   Musculoskeletal:      Comments: Bruising of left arm   Neurological:      General: No focal deficit present.      Mental Status: He is alert and oriented to person, place, and time.       Fluids    Intake/Output Summary (Last 24 hours) at 8/19/2022 1402  Last data filed at 8/19/2022 0616  Gross per 24 hour   Intake 50 ml   Output 975 ml   Net -925 ml       Laboratory  Recent Labs     08/18/22  0725 08/19/22  0117   WBC 5.0 5.6   RBC 2.88* 3.17*   HEMOGLOBIN 9.4* 10.4*   HEMATOCRIT 29.0* 31.6*   .7* 99.7*   MCH 32.6 32.8   MCHC 32.4* 32.9*   RDW 51.2* 49.5   PLATELETCT 121* 130*   MPV 10.9 11.2     Recent Labs     08/18/22  0725 08/19/22  0117   SODIUM 136 136   POTASSIUM 3.9 4.0   CHLORIDE 103 100   CO2 25 22   GLUCOSE 119* 113*   BUN 27* 24*   CREATININE 1.29 1.06   CALCIUM 8.1* 9.0                   Imaging  MR-BRAIN-W/O   Final Result         Age-related volume loss and chronic microvascular ischemic changes.      No acute process.      Left frontal supraorbital scalp hematoma noted.      DX-FOREARM LEFT   Final Result      1.  No evidence of acute fracture or dislocation.   2.  Degenerative changes.   3.  Mild soft tissue swelling.   4.  Atherosclerotic plaque.      DX-CHEST-PORTABLE (1 VIEW)   Final Result      1.  Small right and trace left pleural effusion with overlying atelectasis/consolidation.   2.  Cardiomegaly.   3.  Atherosclerotic plaque.      CT-CTA NECK WITH & W/O-POST PROCESSING   Final Result         1.  Severe stenosis of the proximal left internal carotid artery   2.  Small  layering bilateral pleural effusions   3.  Mild mediastinal adenopathy, workup and evaluation for causes of adenopathy recommended as clinically appropriate.         CT-CTA HEAD WITH & W/O-POST PROCESS   Final Result         1.  No large vessel occlusion or aneurysm identified.      CT-MAXILLOFACIAL W/O PLUS RECONS   Final Result         1.  No acute traumatic facial bony injuries identified.      CT-CSPINE WITHOUT PLUS RECONS   Final Result         1.  Multilevel degenerative changes of the cervical spine limit diagnostic sensitivity of this examination, otherwise no acute traumatic bony injury of the cervical spine is apparent.   2.  Small layering bilateral pleural effusions      CL-LEFT HEART CATHETERIZATION WITH POSSIBLE INTERVENTION    (Results Pending)   EC-ECHOCARDIOGRAM COMPLETE W/O CONT    (Results Pending)        Assessment/Plan  * Syncope, abnormal EKG/seizure like activity  Assessment & Plan  MRI brain with no acute pathology   VEEG negative  Pending PT/OT    Left bundle branch block  Assessment & Plan  Pending [facility] on 8/19    Stenosis of right carotid artery  Assessment & Plan  MRI pending  Outpatient follow up    Primary hypertension  Assessment & Plan  Vitals:    08/18/22 1000   BP: 137/65   Pulse: 62   Resp: 20   Temp:    SpO2: 93%     Stable  Hold antihypertensives to eval syncope w/u    Dyslipidemia  Assessment & Plan  Statin    CAD (coronary artery disease)- (present on admission)  Assessment & Plan  ASA, statin       VTE prophylaxis: enoxaparin ppx    I have performed a physical exam and reviewed and updated ROS and Plan today (8/19/2022). In review of yesterday's note (8/18/2022), there are no changes except as documented above.

## 2022-08-19 NOTE — PROGRESS NOTES
Sinus arrhythmia with 1st degree AVB, bundle branch block, and prolonged QT. Occasional multifocal PVC are also present.   HR 78 - 86  MA 0.24  QRS 0.16  QT 0.47

## 2022-08-19 NOTE — PROGRESS NOTES
"Cardiology Follow Up Progress Note    Date of Service  8/19/2022    Attending Physician  Yannick Meier M.D.    Chief Complaint   Chief Complaint   Patient presents with    GLF    Head Injury    ALOC   Syncope with LBBB      HPI  Francisco Stein is a 89 y.o. male admitted 8/18/2022 with per  To, \"89 years old man with prior history of remote coronary stenting (RCA) with Dr. Sands, hypertension, hyperlipidemia, presented to the hospital via EMS because of a true syncopal episode at home.  Patient had complete loss of consciousness.  EKG is found to have baseline left bundle branch block with sinus rhythm.  I personally interpreted the EKG tracing.     No chest pain at this time. No dyspnea.     Troponin T of 119, NT pro BNP of 8065. I personally interpreted the blood tests.\"    Interim Events  8/19/2022: No overnight cardiac events. Tele monitoring personally interpreted by me shows SR. VSS-elevated; RA. Labs reviewed. NPO for [facility] today    The risks, benefits, and alternatives to coronary angiography with IV sedation were discussed in great detail. Specific risks mentioned include bleeding, infection, kidney damage, allergic reaction, cardiac perforation with possible tamponade requiring pericardiocentesis or possibly open heart surgery. In addition, we discussed that 10% of patients will experience small to moderate bruising at the site of the arterial puncture. Lastly, the risks of heart attack, stroke and death were discussed; the risk of major complications such as heart attack or stroke caused by the angiogram is approximately 1%; the risk of death is approximately 1 in 1000. The patient verbalized understanding of the potential complications and wishes to proceed with this procedure.    Review of Systems  Review of Systems   Constitutional:  Negative for fatigue and fever.   Respiratory:  Negative for chest tightness and shortness of breath.    Cardiovascular:  Negative for chest pain, palpitations and leg " swelling.   Neurological:  Positive for dizziness and syncope. Negative for weakness and light-headedness.     Vital signs in last 24 hours  Temp:  [36.2 °C (97.1 °F)-36.9 °C (98.5 °F)] 36.9 °C (98.5 °F)  Pulse:  [62-85] 85  Resp:  [16-20] 18  BP: (137-161)/(65-81) 139/75  SpO2:  [90 %-94 %] 94 %    Physical Exam  Physical Exam  Constitutional:       General: He is not in acute distress.  Cardiovascular:      Rate and Rhythm: Normal rate and regular rhythm.      Pulses:           Carotid pulses are 1+ on the right side and 1+ on the left side.       Radial pulses are 2+ on the right side and 2+ on the left side.        Popliteal pulses are 2+ on the right side and 2+ on the left side.      Heart sounds: Murmur (radiates to carotids) heard.   Systolic murmur is present with a grade of 3/6.   Pulmonary:      Effort: Pulmonary effort is normal. No respiratory distress.      Breath sounds: Normal breath sounds.   Musculoskeletal:      Right lower leg: No edema.      Left lower leg: No edema.   Skin:     General: Skin is warm and dry.      Coloration: Skin is pale.   Neurological:      General: No focal deficit present.      Mental Status: He is alert and oriented to person, place, and time. Mental status is at baseline.       Lab Review  Lab Results   Component Value Date/Time    WBC 5.6 08/19/2022 01:17 AM    RBC 3.17 (L) 08/19/2022 01:17 AM    HEMOGLOBIN 10.4 (L) 08/19/2022 01:17 AM    HEMATOCRIT 31.6 (L) 08/19/2022 01:17 AM    MCV 99.7 (H) 08/19/2022 01:17 AM    MCH 32.8 08/19/2022 01:17 AM    MCHC 32.9 (L) 08/19/2022 01:17 AM    MPV 11.2 08/19/2022 01:17 AM      Lab Results   Component Value Date/Time    SODIUM 136 08/19/2022 01:17 AM    POTASSIUM 4.0 08/19/2022 01:17 AM    CHLORIDE 100 08/19/2022 01:17 AM    CO2 22 08/19/2022 01:17 AM    GLUCOSE 113 (H) 08/19/2022 01:17 AM    BUN 24 (H) 08/19/2022 01:17 AM    CREATININE 1.06 08/19/2022 01:17 AM      Lab Results   Component Value Date/Time    ASTSGOT 16 08/18/2022  07:25 AM    ALTSGPT 11 08/18/2022 07:25 AM     Lab Results   Component Value Date/Time    TROPONINT 119 (H) 08/18/2022 07:25 AM       Recent Labs     08/18/22  0725   NTPROBNP 8065*       Cardiac Imaging and Procedures Review  EKG:  SB with LBBB    Echocardiogram:  Pending    Cardiac Catheterization:  pending    Imaging  Chest X-Ray (8/18/2022):  1.  Small right and trace left pleural effusion with overlying atelectasis/consolidation.  2.  Cardiomegaly.  3.  Atherosclerotic plaque.     Assessment/Plan  Syncope; LBBB; CAD s/p PCI to RCA (remote); HTN; HLD; right carotid stenosis  -NPO for [facility] today  -Hx dizziness with ambulation. No symptoms prior to syncopal event.   -Neuro for possible seizure  -losartan, statin, asa  -echo pending  -Transfer to tele post-[facility]     Thank you for allowing me to participate in the care of this patient.  I will continue to follow this patient    Please contact me with any questions.    Please see Dr. Martínez's attestation for further details and MDM.     I personally spent a total of 10 minutes which includes face-to-face time and non-face-to-face time spent on preparing to see the patient, reviewing hospital notes and tests, obtaining history from the patient, performing a medically appropriate exam, counseling and educating the patient, ordering medications/tests/procedures/referrals as clinically indicated, and documenting information in the electronic medical record.    JASEN Bell.   [facility]  [phone]

## 2022-08-19 NOTE — THERAPY
Missed Therapy     Patient Name: Francisco Stein  Age:  89 y.o., Sex:  male  Medical Record #: [id number]  Today's Date: 8/19/2022 08/19/22 7248   Interdisciplinary Plan of Care Collaboration   Collaboration Comments PT consult received. Pt with bedrest activity orders and awaiting heart cath while undergoing workup for syncope and possible seizure. Will hold at this time, and perform eval once medically appropriate and w/updated activity orders.

## 2022-08-20 ENCOUNTER — APPOINTMENT (OUTPATIENT)
Dept: CARDIOLOGY | Facility: MEDICAL CENTER | Age: 87
DRG: 987 | End: 2022-08-20
Attending: INTERNAL MEDICINE
Payer: MEDICARE

## 2022-08-20 LAB
ANION GAP SERPL CALC-SCNC: 15 MMOL/L (ref 7–16)
BUN SERPL-MCNC: 28 MG/DL (ref 8–22)
CALCIUM SERPL-MCNC: 9 MG/DL (ref 8.5–10.5)
CHLORIDE SERPL-SCNC: 100 MMOL/L (ref 96–112)
CO2 SERPL-SCNC: 21 MMOL/L (ref 20–33)
CREAT SERPL-MCNC: 0.94 MG/DL (ref 0.5–1.4)
ERYTHROCYTE [DISTWIDTH] IN BLOOD BY AUTOMATED COUNT: 50.6 FL (ref 35.9–50)
GFR SERPLBLD CREATININE-BSD FMLA CKD-EPI: 76 ML/MIN/1.73 M 2
GLUCOSE SERPL-MCNC: 112 MG/DL (ref 65–99)
HCT VFR BLD AUTO: 33.7 % (ref 42–52)
HGB BLD-MCNC: 11.3 G/DL (ref 14–18)
MCH RBC QN AUTO: 33.8 PG (ref 27–33)
MCHC RBC AUTO-ENTMCNC: 33.5 G/DL (ref 33.7–35.3)
MCV RBC AUTO: 100.9 FL (ref 81.4–97.8)
PLATELET # BLD AUTO: 135 K/UL (ref 164–446)
PMV BLD AUTO: 11.2 FL (ref 9–12.9)
POTASSIUM SERPL-SCNC: 4.2 MMOL/L (ref 3.6–5.5)
RBC # BLD AUTO: 3.34 M/UL (ref 4.7–6.1)
SODIUM SERPL-SCNC: 136 MMOL/L (ref 135–145)
WBC # BLD AUTO: 9.3 K/UL (ref 4.8–10.8)

## 2022-08-20 PROCEDURE — A9270 NON-COVERED ITEM OR SERVICE: HCPCS | Performed by: INTERNAL MEDICINE

## 2022-08-20 PROCEDURE — 700102 HCHG RX REV CODE 250 W/ 637 OVERRIDE(OP): Performed by: HOSPITALIST

## 2022-08-20 PROCEDURE — A9270 NON-COVERED ITEM OR SERVICE: HCPCS | Performed by: NURSE PRACTITIONER

## 2022-08-20 PROCEDURE — 85027 COMPLETE CBC AUTOMATED: CPT

## 2022-08-20 PROCEDURE — 700102 HCHG RX REV CODE 250 W/ 637 OVERRIDE(OP): Performed by: NURSE PRACTITIONER

## 2022-08-20 PROCEDURE — 700101 HCHG RX REV CODE 250: Performed by: HOSPITALIST

## 2022-08-20 PROCEDURE — 770020 HCHG ROOM/CARE - TELE (206)

## 2022-08-20 PROCEDURE — A9270 NON-COVERED ITEM OR SERVICE: HCPCS | Performed by: HOSPITALIST

## 2022-08-20 PROCEDURE — 99233 SBSQ HOSP IP/OBS HIGH 50: CPT | Performed by: HOSPITALIST

## 2022-08-20 PROCEDURE — 700102 HCHG RX REV CODE 250 W/ 637 OVERRIDE(OP): Performed by: INTERNAL MEDICINE

## 2022-08-20 PROCEDURE — 93005 ELECTROCARDIOGRAM TRACING: CPT | Performed by: NURSE PRACTITIONER

## 2022-08-20 PROCEDURE — 80048 BASIC METABOLIC PNL TOTAL CA: CPT

## 2022-08-20 RX ORDER — LATANOPROST 50 UG/ML
1 SOLUTION/ DROPS OPHTHALMIC EVERY EVENING
Status: DISCONTINUED | OUTPATIENT
Start: 2022-08-20 | End: 2022-08-21

## 2022-08-20 RX ORDER — METOPROLOL SUCCINATE 25 MG/1
50 TABLET, EXTENDED RELEASE ORAL
Status: DISCONTINUED | OUTPATIENT
Start: 2022-08-20 | End: 2022-08-21

## 2022-08-20 RX ORDER — TIMOLOL MALEATE 5 MG/ML
1 SOLUTION/ DROPS OPHTHALMIC EVERY EVENING
Status: DISCONTINUED | OUTPATIENT
Start: 2022-08-20 | End: 2022-08-21

## 2022-08-20 RX ADMIN — DOCUSATE SODIUM 50 MG AND SENNOSIDES 8.6 MG 2 TABLET: 8.6; 5 TABLET, FILM COATED ORAL at 04:59

## 2022-08-20 RX ADMIN — ASPIRIN 81 MG: 81 TABLET, CHEWABLE ORAL at 05:00

## 2022-08-20 RX ADMIN — DOCUSATE SODIUM 50 MG AND SENNOSIDES 8.6 MG 2 TABLET: 8.6; 5 TABLET, FILM COATED ORAL at 16:19

## 2022-08-20 RX ADMIN — TIMOLOL MALEATE 1 DROP: 5 SOLUTION OPHTHALMIC at 18:19

## 2022-08-20 RX ADMIN — OMEPRAZOLE 20 MG: 20 CAPSULE, DELAYED RELEASE ORAL at 04:59

## 2022-08-20 RX ADMIN — LATANOPROST 1 DROP: 50 SOLUTION OPHTHALMIC at 18:19

## 2022-08-20 RX ADMIN — Medication 1000 UNITS: at 16:19

## 2022-08-20 RX ADMIN — METOPROLOL SUCCINATE 50 MG: 25 TABLET, EXTENDED RELEASE ORAL at 20:20

## 2022-08-20 RX ADMIN — Medication 1000 UNITS: at 04:59

## 2022-08-20 RX ADMIN — OMEGA-3 FATTY ACIDS CAP 1000 MG 1000 MG: 1000 CAP at 16:19

## 2022-08-20 RX ADMIN — LOSARTAN POTASSIUM 25 MG: 50 TABLET, FILM COATED ORAL at 05:00

## 2022-08-20 RX ADMIN — SIMVASTATIN 40 MG: 20 TABLET, FILM COATED ORAL at 20:19

## 2022-08-20 ASSESSMENT — ENCOUNTER SYMPTOMS
FALLS: 1
COUGH: 0
ABDOMINAL PAIN: 0
PALPITATIONS: 0
DIZZINESS: 0
SHORTNESS OF BREATH: 0
HEADACHES: 0
CHILLS: 0
NAUSEA: 0
VOMITING: 0
FEVER: 0

## 2022-08-20 ASSESSMENT — PAIN DESCRIPTION - PAIN TYPE: TYPE: ACUTE PAIN

## 2022-08-20 NOTE — CARE PLAN
The patient is Stable - Low risk of patient condition declining or worsening    Shift Goals  Clinical Goals: wound management, safety, heart cath tomorrow  Patient Goals: rest  Family Goals: rest    Progress made toward(s) clinical / shift goals:    Problem: Pain - Standard  Goal: Alleviation of pain or a reduction in pain to the patient’s comfort goal  Outcome: Progressing   PRN pain medications in use for pain control. Patient denies pain this shift.   Problem: Knowledge Deficit - Standard  Goal: Patient and family/care givers will demonstrate understanding of plan of care, disease process/condition, diagnostic tests and medications  Outcome: Progressing   Pt actively participates in POC. Pt and board updated, all questions and concerns answered. Pt encouraged to voice all questions and concerns.   Problem: Fall Risk  Goal: Patient will remain free from falls  Outcome: Progressing   Safety and fall education given. All fall precautions are in place with belongings at bedside table. Needs are tended to. Educated to use call light for assistance. Pt verbalized understanding.    Problem: Skin Integrity  Goal: Skin integrity is maintained or improved  Outcome: Progressing   Appropriate interventions in place to protect skin. dry flows in place, continent and uses urinal for urinary elimination. Bed changes PRN. Heels floated, extra pillows for positioning.

## 2022-08-20 NOTE — THERAPY
Physical Therapy Contact Note    Patient Name: Francisco Stein  Age:  89 y.o., Sex:  male  Medical Record #: [id number]  Today's Date: 8/20/2022 08/20/22 0708   Interdisciplinary Plan of Care Collaboration   Collaboration Comments Per chart review pt with bed rest orders and pending heart cath.  Will continue to HOLD and perform eval once medically appropriate & with updated activity orders.     Michell Hurt, PT, DPT

## 2022-08-20 NOTE — PROGRESS NOTES
Hospital Medicine Daily Progress Note    Date of Service  8/20/2022    Chief Complaint  Francisco Stein is a 89 y.o. male admitted 8/18/2022 with GLF    Hospital Course  89-year-old male with past medical history of hypertension, CAD s/p PCI, DL D presenting after ground-level fall with some seizure-like activity.  Telemetry and EMS had revealed possible heart block.  CT maxillofacial and C-spine showed no evidence of injury.  CTA of head and neck showed proximal left ICA stenosis.  Patient did have a left hand laceration which was sutured by ERP.  EEG did not show any seizure-like activity.  MRI brain showed left scalp hematoma.     He was noted to have a troponin of 119 and cardiology was consulted and patient will be proceeding with a left heart cath.    Interval Problem Update  No acute events overnight  He is feeling well without any complaints  Pending cath today   Had 4.3 second sinus pause on tele- not on any BB      I have discussed this patient's plan of care and discharge plan at IDT rounds today with Case Management, Nursing, Nursing leadership, and other members of the IDT team.    Consultants/Specialty  cardiology    Code Status  Full Code    Disposition  Patient is not medically cleared for discharge.   Anticipate discharge to  tbd .  I have placed the appropriate orders for post-discharge needs.    Review of Systems  Review of Systems   Constitutional:  Negative for chills and fever.   Respiratory:  Negative for cough and shortness of breath.    Cardiovascular:  Negative for chest pain and palpitations.   Gastrointestinal:  Negative for abdominal pain, nausea and vomiting.   Genitourinary:  Negative for dysuria and urgency.   Musculoskeletal:  Positive for falls.   Neurological:  Negative for dizziness and headaches.   All other systems reviewed and are negative.     Physical Exam  Temp:  [36.6 °C (97.9 °F)-37.1 °C (98.7 °F)] 36.9 °C (98.5 °F)  Pulse:  [] 101  Resp:  [16-18] 18  BP:  (115-156)/(75-99) 156/91  SpO2:  [95 %-96 %] 96 %    Physical Exam  Vitals and nursing note reviewed.   Constitutional:       Appearance: Normal appearance.   Eyes:      Comments: Periorbital ecchymosis of left eye, barely able to open eye   Cardiovascular:      Rate and Rhythm: Normal rate and regular rhythm.      Heart sounds: No murmur heard.     Comments: Bruising on chest  Pulmonary:      Effort: Pulmonary effort is normal. No respiratory distress.      Breath sounds: Normal breath sounds.   Musculoskeletal:      Comments: Bruising of left arm   Neurological:      General: No focal deficit present.      Mental Status: He is alert and oriented to person, place, and time.       Fluids  No intake or output data in the 24 hours ending 08/20/22 1435      Laboratory  Recent Labs     08/18/22 0725 08/19/22 0117 08/20/22  0640   WBC 5.0 5.6 9.3   RBC 2.88* 3.17* 3.34*   HEMOGLOBIN 9.4* 10.4* 11.3*   HEMATOCRIT 29.0* 31.6* 33.7*   .7* 99.7* 100.9*   MCH 32.6 32.8 33.8*   MCHC 32.4* 32.9* 33.5*   RDW 51.2* 49.5 50.6*   PLATELETCT 121* 130* 135*   MPV 10.9 11.2 11.2     Recent Labs     08/18/22 0725 08/19/22  0117 08/20/22  0640   SODIUM 136 136 136   POTASSIUM 3.9 4.0 4.2   CHLORIDE 103 100 100   CO2 25 22 21   GLUCOSE 119* 113* 112*   BUN 27* 24* 28*   CREATININE 1.29 1.06 0.94   CALCIUM 8.1* 9.0 9.0                   Imaging  EC-ECHOCARDIOGRAM COMPLETE W/O CONT   Final Result      MR-BRAIN-W/O   Final Result         Age-related volume loss and chronic microvascular ischemic changes.      No acute process.      Left frontal supraorbital scalp hematoma noted.      DX-FOREARM LEFT   Final Result      1.  No evidence of acute fracture or dislocation.   2.  Degenerative changes.   3.  Mild soft tissue swelling.   4.  Atherosclerotic plaque.      DX-CHEST-PORTABLE (1 VIEW)   Final Result      1.  Small right and trace left pleural effusion with overlying atelectasis/consolidation.   2.  Cardiomegaly.   3.   Atherosclerotic plaque.      CT-CTA NECK WITH & W/O-POST PROCESSING   Final Result         1.  Severe stenosis of the proximal left internal carotid artery   2.  Small layering bilateral pleural effusions   3.  Mild mediastinal adenopathy, workup and evaluation for causes of adenopathy recommended as clinically appropriate.         CT-CTA HEAD WITH & W/O-POST PROCESS   Final Result         1.  No large vessel occlusion or aneurysm identified.      CT-MAXILLOFACIAL W/O PLUS RECONS   Final Result         1.  No acute traumatic facial bony injuries identified.      CT-CSPINE WITHOUT PLUS RECONS   Final Result         1.  Multilevel degenerative changes of the cervical spine limit diagnostic sensitivity of this examination, otherwise no acute traumatic bony injury of the cervical spine is apparent.   2.  Small layering bilateral pleural effusions      CL-LEFT HEART CATHETERIZATION WITH POSSIBLE INTERVENTION    (Results Pending)        Assessment/Plan  * Syncope, abnormal EKG/seizure like activity  Assessment & Plan  MRI brain with no acute pathology   VEEG negative  Pending PT/OT    Left bundle branch block  Assessment & Plan  Pending [facility] on 8/20    Stenosis of right carotid artery  Assessment & Plan  MRI pending  Outpatient follow up    Primary hypertension  Assessment & Plan  Vitals:    08/18/22 1000   BP: 137/65   Pulse: 62   Resp: 20   Temp:    SpO2: 93%     Stable  Hold antihypertensives to eval syncope w/u    Dyslipidemia  Assessment & Plan  Statin    CAD (coronary artery disease)- (present on admission)  Assessment & Plan  ASA, statin       VTE prophylaxis: enoxaparin ppx    I have performed a physical exam and reviewed and updated ROS and Plan today (8/20/2022). In review of yesterday's note (8/19/2022), there are no changes except as documented above.

## 2022-08-20 NOTE — DIETARY
Nutrition Services - Unsure wt loss reported on admission but no poor PO noted. Malnutrition Screening Tool of 2.  No measured wt available, estimated wt is 83 kg/ 183 lbs with BMI of 28.66.     Addendum: Spoke with pt's caregiver who reports pt eating  his usual intake PTA (~ 2-3 meals/day with snacks) and no recent wt loss, UBW is ~160 lbs.  No nutrition questions/concerns.  Nutrition assessment not indicated at this time and pt currently NPO.     Plan/Rec:   Provide measured wt as feasible (communicated to RN).    RD to follow for diet advancement and monitor for adequate intake weekly per department policy.   Nutrition Representative to see pt daily for snacks/meal preferences once diet advances.   Please consult RD for nutrition concerns.

## 2022-08-20 NOTE — PROGRESS NOTES
[facility] Cardiology Follow-up Note    Date of Service:    8/20/2022      Name:   Francisco Stein     YOB: 1931  Age:   89 y.o.  male   MRN:   [id number]    Reason for cardiology consult: Syncope with baseline LBBB    Attending Provider: Dr Meier    Primary Cardiologist: Dr. Arambula    HPI:  Mr Stein is a 89 y.o. male admitted on 8/18/2022 for syncope, experienced ground-level fall with facial trauma.  He was found to have severe aortic stenosis on echocardiogram, low flow low gradient, EF 30%.  He has a past medical history of remote coronary stenting (RCA) with Dr. Arambula, hypertension, hyperlipidemia, chronic A. Fib (per [facility] chart review).  Seen by Dr. Arambula in October 2021, no mention of AS at that time.    Patient was waiting to to undergo LHC yesterday and today however due to STEMI's assessment postponed until tomorrow.    Interim Events:  - Personal Telemetry interpretation: SR 90's, 4.3s pause, per RN patient was asymptomatic  - Vitals: 140-150's/80-90's  - Labs reviewed: Trop 112    ROS  Constitutional: + fatigue.  Pain to left lateral eye area  Respiratory: + shortness of breath, no cough.  Cardiovascular: denies chest pain. Denies lower extremity edema.  Denies orthopnea or PND.  : denies polyuria, no dysuria.  GI:  Denies nausea/vomiting.  No abdominal distention.  Neuro:  Denies dizziness, + syncope at home.  Hem/lymph: Denies easy bleeding/bruising.      All other review of systems reviewed and negative.    Past medical, surgical, social, and family history reviewed and unchanged from admission except as noted in HPI.    Medications: Reviewed in MAR  Current Facility-Administered Medications   Medication Dose Frequency Provider Last Rate Last Admin    losartan (COZAAR) tablet 25 mg  25 mg Q DAY HAROLDO BellP.RViolettaN.   25 mg at 08/20/22 0500    aspirin (ASA) chewable tab 81 mg  81 mg DAILY Domingo Thurman M.D.   81 mg at 08/20/22 0500    fish oil capsule 1,000 mg  1,000  "mg TID WITH MEALS Domingo Thurman M.D.   1,000 mg at 08/19/22 1704    omeprazole (PRILOSEC) capsule 20 mg  20 mg DAILY Domingo Thurman M.D.   20 mg at 08/20/22 0459    simvastatin (ZOCOR) tablet 40 mg  40 mg Nightly Domingo Thurman M.D.   40 mg at 08/19/22 2028    timolol (TIMOPTIC) 0.5 % ophthalmic solution 1 Drop  1 Drop DAILY Domingo Thurman M.D.   1 Drop at 08/19/22 0610    vitamin D3 (cholecalciferol) tablet 1,000 Units  1,000 Units BID Domingo Thurman M.D.   1,000 Units at 08/20/22 0459    senna-docusate (PERICOLACE or SENOKOT S) 8.6-50 MG per tablet 2 Tablet  2 Tablet BID Domingo Thurman M.D.   2 Tablet at 08/20/22 0459    And    polyethylene glycol/lytes (MIRALAX) PACKET 1 Packet  1 Packet QDAY PRN Domingo Thurman M.D.        And    magnesium hydroxide (MILK OF MAGNESIA) suspension 30 mL  30 mL QDAY PRN Domingo Thurman M.D.        And    bisacodyl (DULCOLAX) suppository 10 mg  10 mg QDAY PRN Domingo Thurman M.D.        acetaminophen (Tylenol) tablet 650 mg  650 mg Q6HRS PRN Domingo Thurman M.D.        enoxaparin (Lovenox) inj 40 mg  40 mg DAILY AT 1800 Domingo Thurman M.D.       Last reviewed on 8/18/2022 11:26 AM by Damián Casanova   No Known Allergies    Physical Exam  Body mass index is 28.66 kg/m². BP (!) 156/91   Pulse (!) 101   Temp 36.9 °C (98.5 °F) (Temporal)   Resp 18   Ht 1.702 m (5' 7\")   Wt 83 kg (183 lb)   SpO2 96%    Vitals:    08/20/22 0017 08/20/22 0412 08/20/22 0712 08/20/22 1117   BP: 115/87 (!) 139/99 (!) 150/82 (!) 156/91   Pulse: 94 99 96 (!) 101   Resp: 16 17 18 18   Temp: 37.1 °C (98.7 °F) 36.6 °C (97.9 °F) 36.6 °C (97.9 °F) 36.9 °C (98.5 °F)   TempSrc: Temporal Temporal Temporal Temporal   SpO2: 96% 96% 96% 96%   Weight:       Height:        Oxygen Therapy:  Pulse Oximetry: 96 %, O2 (LPM): 2.5, O2 Delivery Device: Silicone Nasal Cannula    General: no acute distress, chronically ill appearing  Neck: No JVD, no bruits  Lungs: CTAB, normal effort. no " wheezing, rales, or rhonchi  Heart: RRR, normal S1 /S2 3/6 systolic murmur radiating to carotids, no rub  EXT: No lower extremity edema, 2+ radial pulses. 2+ pedal pulses.   Abdomen: soft, non tender, non distended  Neurological: No focal deficits, no facial asymmetry.  Normal speech  Psychiatric: Appropriate affect, alert and oriented x 3  Skin: Warm and dry extremities, no rashes    Labs (personally reviewed):     Lab Results   Component Value Date/Time    SODIUM 136 08/20/2022 06:40 AM    POTASSIUM 4.2 08/20/2022 06:40 AM    CHLORIDE 100 08/20/2022 06:40 AM    CO2 21 08/20/2022 06:40 AM    GLUCOSE 112 (H) 08/20/2022 06:40 AM    BUN 28 (H) 08/20/2022 06:40 AM    CREATININE 0.94 08/20/2022 06:40 AM     Lab Results   Component Value Date/Time    ALKPHOSPHAT 47 08/18/2022 07:25 AM    ASTSGOT 16 08/18/2022 07:25 AM    ALTSGPT 11 08/18/2022 07:25 AM    TBILIRUBIN 0.4 08/18/2022 07:25 AM      No results found for: CHOLSTRLTOT, LDL, HDL, TRIGLYCERIDE   Latest Reference Range & Units 8/18/22 07:25     Troponin T 6 - 19 ng/L 119 (H)   NT-proBNP 0 - 125 pg/mL 8065 (H)   (H): Data is abnormally high    Cardiac Imaging and Procedures Review:      EKG 8/18/22: My Personal interpretation reveals SB/junctional 58, LBBB    EKG 8/20/22: My Personal interpretation reveals SR 98 LBBB    Echo 8/19/22  Severely reduced left ventricular systolic function.  Global hypokinesis  Reduced right ventricular systolic function.  Severe concentric left ventricular hypertrophy.  Severe aortic valve stenosis - LF-LG type  Estimated right ventricular systolic pressure is 60 mmHg.  Moderate tricuspid regurgitation.    [facility] 8/20/22:  (Pending)    Assessment and Medical Decision Making:    Syncope  -No seizures noted on EEG results  -Possibly secondary to severe AAS and or high degree AV block given A. fib with LBBB and pauses  -Patient experienced 4.3-second pause today, per RN was awake sitting in chair and asymptomatic  -Spoke with EPDr. 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  Please contact me with any questions or concerns.    Please see Dr. Martínez's attestation for additions and further recommendations.    I personally spent a total of 20 minutes which includes face-to-face time and non-face-to-face time spent on preparing to see the patient, reviewing hospital notes and tests, obtaining history from the patient, performing a medically appropriate exam, counseling and educating the patient, ordering medications/tests/procedures/referrals as clinically indicated, and documenting information in the electronic medical record.      PLEASE NOTE: Some of this dictation was created using voice recognition software. I have made every reasonable attempt to correct obvious errors, but I expect that there are errors of grammar and possibly content that I did not discover before finalizing the note.     JASEN Schuster.   [facility]  [phone]

## 2022-08-20 NOTE — PROGRESS NOTES
Pt heart cath postponed today again. Pt starting to have more frequent PVCs on monitor and had a 4.3 sec pause. Not symptomatic. Provider and APRN Cardiology made aware.

## 2022-08-21 ENCOUNTER — APPOINTMENT (OUTPATIENT)
Dept: RADIOLOGY | Facility: MEDICAL CENTER | Age: 87
DRG: 987 | End: 2022-08-21
Attending: STUDENT IN AN ORGANIZED HEALTH CARE EDUCATION/TRAINING PROGRAM
Payer: MEDICARE

## 2022-08-21 PROBLEM — I35.0 SEVERE AORTIC STENOSIS: Status: ACTIVE | Noted: 2022-08-21

## 2022-08-21 PROBLEM — I27.20 PULMONARY HTN (HCC): Status: ACTIVE | Noted: 2022-08-21

## 2022-08-21 PROBLEM — I46.9 CARDIAC ARREST (HCC): Status: ACTIVE | Noted: 2022-08-21

## 2022-08-21 PROBLEM — J96.11 HYPOXEMIC RESPIRATORY FAILURE, CHRONIC (HCC): Status: ACTIVE | Noted: 2022-08-21

## 2022-08-21 PROBLEM — Z71.89 COUNSELING REGARDING END OF LIFE DECISION MAKING: Status: ACTIVE | Noted: 2022-08-21

## 2022-08-21 PROBLEM — E03.9 HYPOTHYROID: Status: ACTIVE | Noted: 2022-08-21

## 2022-08-21 LAB
ALBUMIN SERPL BCP-MCNC: 3.3 G/DL (ref 3.2–4.9)
ALBUMIN/GLOB SERPL: 1.3 G/DL
ALP SERPL-CCNC: 58 U/L (ref 30–99)
ALT SERPL-CCNC: 21 U/L (ref 2–50)
ANION GAP SERPL CALC-SCNC: 13 MMOL/L (ref 7–16)
AST SERPL-CCNC: 28 U/L (ref 12–45)
BASOPHILS # BLD AUTO: 0.1 % (ref 0–1.8)
BASOPHILS # BLD: 0.01 K/UL (ref 0–0.12)
BILIRUB SERPL-MCNC: 0.7 MG/DL (ref 0.1–1.5)
BUN SERPL-MCNC: 30 MG/DL (ref 8–22)
CALCIUM SERPL-MCNC: 8.6 MG/DL (ref 8.5–10.5)
CHLORIDE SERPL-SCNC: 101 MMOL/L (ref 96–112)
CO2 SERPL-SCNC: 22 MMOL/L (ref 20–33)
CORTIS SERPL-MCNC: 47.7 UG/DL (ref 0–23)
CREAT SERPL-MCNC: 0.92 MG/DL (ref 0.5–1.4)
EKG IMPRESSION: NORMAL
EKG IMPRESSION: NORMAL
EOSINOPHIL # BLD AUTO: 0 K/UL (ref 0–0.51)
EOSINOPHIL NFR BLD: 0 % (ref 0–6.9)
ERYTHROCYTE [DISTWIDTH] IN BLOOD BY AUTOMATED COUNT: 51.3 FL (ref 35.9–50)
GFR SERPLBLD CREATININE-BSD FMLA CKD-EPI: 78 ML/MIN/1.73 M 2
GLOBULIN SER CALC-MCNC: 2.6 G/DL (ref 1.9–3.5)
GLUCOSE BLD STRIP.AUTO-MCNC: 119 MG/DL (ref 65–99)
GLUCOSE SERPL-MCNC: 119 MG/DL (ref 65–99)
HCT VFR BLD AUTO: 33.9 % (ref 42–52)
HGB BLD-MCNC: 10.9 G/DL (ref 14–18)
IMM GRANULOCYTES # BLD AUTO: 0.04 K/UL (ref 0–0.11)
IMM GRANULOCYTES NFR BLD AUTO: 0.5 % (ref 0–0.9)
LACTATE SERPL-SCNC: 1.8 MMOL/L (ref 0.5–2)
LACTATE SERPL-SCNC: 2 MMOL/L (ref 0.5–2)
LYMPHOCYTES # BLD AUTO: 0.9 K/UL (ref 1–4.8)
LYMPHOCYTES NFR BLD: 11.4 % (ref 22–41)
MAGNESIUM SERPL-MCNC: 2 MG/DL (ref 1.5–2.5)
MCH RBC QN AUTO: 33.3 PG (ref 27–33)
MCHC RBC AUTO-ENTMCNC: 32.2 G/DL (ref 33.7–35.3)
MCV RBC AUTO: 103.7 FL (ref 81.4–97.8)
MONOCYTES # BLD AUTO: 0.56 K/UL (ref 0–0.85)
MONOCYTES NFR BLD AUTO: 7.1 % (ref 0–13.4)
NEUTROPHILS # BLD AUTO: 6.41 K/UL (ref 1.82–7.42)
NEUTROPHILS NFR BLD: 80.9 % (ref 44–72)
NRBC # BLD AUTO: 0 K/UL
NRBC BLD-RTO: 0 /100 WBC
NT-PROBNP SERPL IA-MCNC: ABNORMAL PG/ML (ref 0–125)
PHOSPHATE SERPL-MCNC: 4.1 MG/DL (ref 2.5–4.5)
PLATELET # BLD AUTO: 123 K/UL (ref 164–446)
PMV BLD AUTO: 11 FL (ref 9–12.9)
POTASSIUM SERPL-SCNC: 4.4 MMOL/L (ref 3.6–5.5)
PROCALCITONIN SERPL-MCNC: 0.06 NG/ML
PROT SERPL-MCNC: 5.9 G/DL (ref 6–8.2)
RBC # BLD AUTO: 3.27 M/UL (ref 4.7–6.1)
SODIUM SERPL-SCNC: 136 MMOL/L (ref 135–145)
TROPONIN T SERPL-MCNC: 250 NG/L (ref 6–19)
TSH SERPL DL<=0.005 MIU/L-ACNC: 2.39 UIU/ML (ref 0.38–5.33)
WBC # BLD AUTO: 7.9 K/UL (ref 4.8–10.8)

## 2022-08-21 PROCEDURE — 99233 SBSQ HOSP IP/OBS HIGH 50: CPT | Mod: FS | Performed by: INTERNAL MEDICINE

## 2022-08-21 PROCEDURE — A9270 NON-COVERED ITEM OR SERVICE: HCPCS | Performed by: STUDENT IN AN ORGANIZED HEALTH CARE EDUCATION/TRAINING PROGRAM

## 2022-08-21 PROCEDURE — 700102 HCHG RX REV CODE 250 W/ 637 OVERRIDE(OP): Performed by: INTERNAL MEDICINE

## 2022-08-21 PROCEDURE — 99292 CRITICAL CARE ADDL 30 MIN: CPT | Mod: 25 | Performed by: EMERGENCY MEDICINE

## 2022-08-21 PROCEDURE — 99291 CRITICAL CARE FIRST HOUR: CPT | Mod: 25 | Performed by: STUDENT IN AN ORGANIZED HEALTH CARE EDUCATION/TRAINING PROGRAM

## 2022-08-21 PROCEDURE — 93010 ELECTROCARDIOGRAM REPORT: CPT | Mod: 76 | Performed by: INTERNAL MEDICINE

## 2022-08-21 PROCEDURE — 93005 ELECTROCARDIOGRAM TRACING: CPT | Performed by: STUDENT IN AN ORGANIZED HEALTH CARE EDUCATION/TRAINING PROGRAM

## 2022-08-21 PROCEDURE — 700102 HCHG RX REV CODE 250 W/ 637 OVERRIDE(OP): Performed by: STUDENT IN AN ORGANIZED HEALTH CARE EDUCATION/TRAINING PROGRAM

## 2022-08-21 PROCEDURE — 700102 HCHG RX REV CODE 250 W/ 637 OVERRIDE(OP): Performed by: NURSE PRACTITIONER

## 2022-08-21 PROCEDURE — 5A12012 PERFORMANCE OF CARDIAC OUTPUT, SINGLE, MANUAL: ICD-10-PCS | Performed by: STUDENT IN AN ORGANIZED HEALTH CARE EDUCATION/TRAINING PROGRAM

## 2022-08-21 PROCEDURE — 93010 ELECTROCARDIOGRAM REPORT: CPT | Performed by: INTERNAL MEDICINE

## 2022-08-21 PROCEDURE — 84484 ASSAY OF TROPONIN QUANT: CPT

## 2022-08-21 PROCEDURE — 84100 ASSAY OF PHOSPHORUS: CPT

## 2022-08-21 PROCEDURE — 82533 TOTAL CORTISOL: CPT

## 2022-08-21 PROCEDURE — 80053 COMPREHEN METABOLIC PANEL: CPT

## 2022-08-21 PROCEDURE — 92950 HEART/LUNG RESUSCITATION CPR: CPT | Performed by: STUDENT IN AN ORGANIZED HEALTH CARE EDUCATION/TRAINING PROGRAM

## 2022-08-21 PROCEDURE — 71045 X-RAY EXAM CHEST 1 VIEW: CPT

## 2022-08-21 PROCEDURE — 84443 ASSAY THYROID STIM HORMONE: CPT

## 2022-08-21 PROCEDURE — A9270 NON-COVERED ITEM OR SERVICE: HCPCS | Performed by: INTERNAL MEDICINE

## 2022-08-21 PROCEDURE — 84145 PROCALCITONIN (PCT): CPT

## 2022-08-21 PROCEDURE — 83605 ASSAY OF LACTIC ACID: CPT

## 2022-08-21 PROCEDURE — 99497 ADVNCD CARE PLAN 30 MIN: CPT | Mod: FS | Performed by: INTERNAL MEDICINE

## 2022-08-21 PROCEDURE — 770001 HCHG ROOM/CARE - MED/SURG/GYN PRIV*

## 2022-08-21 PROCEDURE — 83735 ASSAY OF MAGNESIUM: CPT

## 2022-08-21 PROCEDURE — 700111 HCHG RX REV CODE 636 W/ 250 OVERRIDE (IP): Performed by: STUDENT IN AN ORGANIZED HEALTH CARE EDUCATION/TRAINING PROGRAM

## 2022-08-21 PROCEDURE — 82962 GLUCOSE BLOOD TEST: CPT

## 2022-08-21 PROCEDURE — 85025 COMPLETE CBC W/AUTO DIFF WBC: CPT

## 2022-08-21 PROCEDURE — A9270 NON-COVERED ITEM OR SERVICE: HCPCS | Performed by: NURSE PRACTITIONER

## 2022-08-21 PROCEDURE — 99233 SBSQ HOSP IP/OBS HIGH 50: CPT | Performed by: STUDENT IN AN ORGANIZED HEALTH CARE EDUCATION/TRAINING PROGRAM

## 2022-08-21 PROCEDURE — 83880 ASSAY OF NATRIURETIC PEPTIDE: CPT

## 2022-08-21 RX ORDER — OMEPRAZOLE 20 MG/1
20 CAPSULE, DELAYED RELEASE ORAL DAILY
Status: DISCONTINUED | OUTPATIENT
Start: 2022-08-22 | End: 2022-08-21

## 2022-08-21 RX ORDER — FUROSEMIDE 10 MG/ML
10 INJECTION INTRAMUSCULAR; INTRAVENOUS
Status: DISCONTINUED | OUTPATIENT
Start: 2022-08-21 | End: 2022-08-21

## 2022-08-21 RX ORDER — DIAZEPAM 5 MG/ML
2.5 INJECTION, SOLUTION INTRAMUSCULAR; INTRAVENOUS
Status: DISCONTINUED | OUTPATIENT
Start: 2022-08-21 | End: 2022-08-22 | Stop reason: HOSPADM

## 2022-08-21 RX ORDER — LEVOTHYROXINE SODIUM 0.05 MG/1
50 TABLET ORAL
Status: DISCONTINUED | OUTPATIENT
Start: 2022-08-22 | End: 2022-08-21

## 2022-08-21 RX ORDER — CHLORAL HYDRATE 500 MG
1000 CAPSULE ORAL
Status: DISCONTINUED | OUTPATIENT
Start: 2022-08-21 | End: 2022-08-21

## 2022-08-21 RX ORDER — ATROPINE SULFATE 10 MG/ML
2 SOLUTION/ DROPS OPHTHALMIC EVERY 4 HOURS PRN
Status: DISCONTINUED | OUTPATIENT
Start: 2022-08-21 | End: 2022-08-21

## 2022-08-21 RX ORDER — ATROPINE SULFATE 10 MG/ML
2 SOLUTION/ DROPS OPHTHALMIC EVERY 4 HOURS PRN
Status: DISCONTINUED | OUTPATIENT
Start: 2022-08-21 | End: 2022-08-22 | Stop reason: HOSPADM

## 2022-08-21 RX ORDER — SIMVASTATIN 20 MG
40 TABLET ORAL EVERY EVENING
Status: DISCONTINUED | OUTPATIENT
Start: 2022-08-21 | End: 2022-08-21

## 2022-08-21 RX ORDER — ONDANSETRON 2 MG/ML
8 INJECTION INTRAMUSCULAR; INTRAVENOUS EVERY 8 HOURS PRN
Status: DISCONTINUED | OUTPATIENT
Start: 2022-08-21 | End: 2022-08-22 | Stop reason: HOSPADM

## 2022-08-21 RX ORDER — ASPIRIN 81 MG/1
81 TABLET, CHEWABLE ORAL DAILY
Status: DISCONTINUED | OUTPATIENT
Start: 2022-08-22 | End: 2022-08-21

## 2022-08-21 RX ORDER — ENALAPRILAT 1.25 MG/ML
1.25 INJECTION INTRAVENOUS EVERY 6 HOURS PRN
Status: DISCONTINUED | OUTPATIENT
Start: 2022-08-21 | End: 2022-08-21

## 2022-08-21 RX ORDER — ONDANSETRON 4 MG/1
8 TABLET, ORALLY DISINTEGRATING ORAL EVERY 8 HOURS PRN
Status: DISCONTINUED | OUTPATIENT
Start: 2022-08-21 | End: 2022-08-22 | Stop reason: HOSPADM

## 2022-08-21 RX ORDER — LEVOTHYROXINE SODIUM 0.05 MG/1
50 TABLET ORAL
Status: DISCONTINUED | OUTPATIENT
Start: 2022-08-21 | End: 2022-08-21

## 2022-08-21 RX ORDER — POLYVINYL ALCOHOL 14 MG/ML
2 SOLUTION/ DROPS OPHTHALMIC EVERY 6 HOURS PRN
Status: DISCONTINUED | OUTPATIENT
Start: 2022-08-21 | End: 2022-08-22 | Stop reason: HOSPADM

## 2022-08-21 RX ORDER — TIMOLOL MALEATE 5 MG/ML
1 SOLUTION/ DROPS OPHTHALMIC DAILY
Status: DISCONTINUED | OUTPATIENT
Start: 2022-08-22 | End: 2022-08-21

## 2022-08-21 RX ORDER — VITAMIN B COMPLEX
1000 TABLET ORAL DAILY
Status: DISCONTINUED | OUTPATIENT
Start: 2022-08-21 | End: 2022-08-21

## 2022-08-21 RX ORDER — IBUPROFEN 400 MG/1
600 TABLET ORAL EVERY 6 HOURS PRN
Status: DISCONTINUED | OUTPATIENT
Start: 2022-08-21 | End: 2022-08-22 | Stop reason: HOSPADM

## 2022-08-21 RX ORDER — LORAZEPAM 2 MG/ML
1 CONCENTRATE ORAL
Status: DISCONTINUED | OUTPATIENT
Start: 2022-08-21 | End: 2022-08-22 | Stop reason: HOSPADM

## 2022-08-21 RX ORDER — ENOXAPARIN SODIUM 100 MG/ML
40 INJECTION SUBCUTANEOUS DAILY
Status: DISCONTINUED | OUTPATIENT
Start: 2022-08-21 | End: 2022-08-21

## 2022-08-21 RX ADMIN — LEVOTHYROXINE SODIUM 50 MCG: 0.05 TABLET ORAL at 05:35

## 2022-08-21 RX ADMIN — FUROSEMIDE 10 MG: 10 INJECTION, SOLUTION INTRAMUSCULAR; INTRAVENOUS at 05:40

## 2022-08-21 RX ADMIN — ASPIRIN 81 MG: 81 TABLET, CHEWABLE ORAL at 05:35

## 2022-08-21 RX ADMIN — LOSARTAN POTASSIUM 25 MG: 50 TABLET, FILM COATED ORAL at 05:35

## 2022-08-21 RX ADMIN — DOCUSATE SODIUM 50 MG AND SENNOSIDES 8.6 MG 2 TABLET: 8.6; 5 TABLET, FILM COATED ORAL at 05:34

## 2022-08-21 RX ADMIN — OMEPRAZOLE 20 MG: 20 CAPSULE, DELAYED RELEASE ORAL at 05:34

## 2022-08-21 RX ADMIN — Medication 1000 UNITS: at 05:34

## 2022-08-21 ASSESSMENT — ENCOUNTER SYMPTOMS
FALLS: 1
BRUISES/BLEEDS EASILY: 1
NAUSEA: 0
ABDOMINAL PAIN: 0
VOMITING: 0
PALPITATIONS: 0
COUGH: 0
FEVER: 0
SHORTNESS OF BREATH: 0
DIZZINESS: 0
HEADACHES: 0
CHILLS: 0

## 2022-08-21 ASSESSMENT — PAIN DESCRIPTION - PAIN TYPE
TYPE: ACUTE PAIN
TYPE: ACUTE PAIN

## 2022-08-21 NOTE — PROGRESS NOTES
[facility] Cardiology Follow-up Note    Date of Service:    8/21/2022      Name:   Francisco Stein     YOB: 1931  Age:   89 y.o.  male   MRN:   [id number]    Reason for cardiology consult: Syncope with baseline LBBB    Attending Provider: Dr Meier    Primary Cardiologist: Dr. Arambula    HPI:  Mr Stein is a 89 y.o. male admitted on 8/18/2022 for syncope, experienced ground-level fall with facial trauma.  He was found to have severe aortic stenosis on echocardiogram, low flow low gradient, EF 30%.  He has a past medical history of remote coronary stenting (RCA) with Dr. Arambula, hypertension, hyperlipidemia, chronic A. Fib (per [facility]'s chart review).  Seen by Dr. Arambula in October 2021, no mention of AS at that time.    Patient was waiting to to undergo LHC yesterday and today however due to STEMI's assessment postponed x 2 days now    Interim Events:  8/21/22  -last night very long 17s asystolic episode, code blue called, 2 chest compressions given and patient woke up and was responsive, he did not require atropine  -Patient has no complaints this morning, family again at bedside  -Tele: sinus rhythm with frequent multifocal PVCs, LBBB  -BP slightly elevated -150's  8/20/22  - Personal Telemetry interpretation: SR 90's, 4.3s pause, per RN patient was asymptomatic  - Vitals: 140-150's/80-90's  - Labs reviewed: Trop 112    ROS  Constitutional: + fatigue.  Pain/itching to left lateral eye area  Respiratory: + shortness of breath, no cough.  Cardiovascular: denies chest pain. Denies lower extremity edema.  Denies orthopnea or PND.  : denies polyuria, no dysuria.  GI:  Denies nausea/vomiting.  No abdominal distention.  Neuro:  Denies dizziness, + syncope at home.  Hem/lymph: Denies easy bleeding/bruising.      All other review of systems reviewed and negative.    Past medical, surgical, social, and family history reviewed and unchanged from admission except as noted in HPI.    Medications: Reviewed  "in MAR  Current Facility-Administered Medications   Medication Dose Frequency Provider Last Rate Last Admin    levothyroxine (SYNTHROID) tablet 50 mcg  50 mcg AM ES Candi Rojas M.D.   50 mcg at 08/21/22 0535    latanoprost (XALATAN) 0.005 % ophthalmic solution 1 Drop  1 Drop Q EVENING Yannick Meier M.D.   1 Drop at 08/20/22 1819    timolol (TIMOPTIC) 0.5 % ophthalmic solution 1 Drop  1 Drop Q EVENING Yannick Meier M.D.   1 Drop at 08/20/22 1819    losartan (COZAAR) tablet 25 mg  25 mg Q DAY RAMSEY Bell   25 mg at 08/21/22 0535    aspirin (ASA) chewable tab 81 mg  81 mg DAILY Domingo Thurman M.D.   81 mg at 08/21/22 0535    simvastatin (ZOCOR) tablet 40 mg  40 mg Nightly Domingo Thurman M.D.   40 mg at 08/20/22 2019    senna-docusate (PERICOLACE or SENOKOT S) 8.6-50 MG per tablet 2 Tablet  2 Tablet BID Domingo Thurman M.D.   2 Tablet at 08/21/22 0534    And    polyethylene glycol/lytes (MIRALAX) PACKET 1 Packet  1 Packet QDAY PRN Domingo Thurman M.D.        And    magnesium hydroxide (MILK OF MAGNESIA) suspension 30 mL  30 mL QDAY PRN Domingo Thurman M.D.        And    bisacodyl (DULCOLAX) suppository 10 mg  10 mg QDAY PRN Domingo Thurman M.D.        acetaminophen (Tylenol) tablet 650 mg  650 mg Q6HRS PRN Domingo Thurman M.D.        enoxaparin (Lovenox) inj 40 mg  40 mg DAILY AT 1800 Domingo Thurman M.D.       Last reviewed on 8/18/2022 11:26 AM by Damián Casanova   No Known Allergies    Physical Exam  Body mass index is 28.66 kg/m². BP (!) 142/81   Pulse 82   Temp 36.2 °C (97.2 °F)   Resp (!) 22   Ht 1.702 m (5' 7\")   Wt 83 kg (183 lb)   SpO2 96%    Vitals:    08/21/22 0705 08/21/22 0743 08/21/22 0800 08/21/22 0810   BP: (!) 159/86 (!) 147/78  (!) 142/81   Pulse: 81 84 84 82   Resp: (!) 26 (!) 26 (!) 28 (!) 22   Temp: 36.2 °C (97.2 °F)      TempSrc:       SpO2: 97% 98% 98% 96%   Weight:       Height:        Oxygen Therapy:  Pulse Oximetry: 96 %, O2 (LPM): 2, O2 " Delivery Device: Silicone Nasal Cannula    General: no acute distress, chronically ill appearing  Neck: No JVD, no bruits  Lungs: CTAB, normal effort. no wheezing, rales, or rhonchi  Heart: RRR, normal S1 /S2 3/6 systolic murmur radiating to carotids, no rub  EXT: No lower extremity edema, 2+ radial pulses. 2+ pedal pulses.   Abdomen: soft, non tender, non distended  Neurological: No focal deficits, no facial asymmetry.  Normal speech  Psychiatric: Appropriate affect, alert and oriented x 3  Skin: Warm and dry extremities, no rashes    Labs (personally reviewed):     Lab Results   Component Value Date/Time    SODIUM 136 08/21/2022 04:07 AM    POTASSIUM 4.4 08/21/2022 04:07 AM    CHLORIDE 101 08/21/2022 04:07 AM    CO2 22 08/21/2022 04:07 AM    GLUCOSE 119 (H) 08/21/2022 04:07 AM    BUN 30 (H) 08/21/2022 04:07 AM    CREATININE 0.92 08/21/2022 04:07 AM     Lab Results   Component Value Date/Time    ALKPHOSPHAT 58 08/21/2022 04:07 AM    ASTSGOT 28 08/21/2022 04:07 AM    ALTSGPT 21 08/21/2022 04:07 AM    TBILIRUBIN 0.7 08/21/2022 04:07 AM      No results found for: CHOLSTRLTOT, LDL, HDL, TRIGLYCERIDE   Latest Reference Range & Units 8/18/22 07:25     Troponin T 6 - 19 ng/L 119 (H)   NT-proBNP 0 - 125 pg/mL 8065 (H)   (H): Data is abnormally high    Cardiac Imaging and Procedures Review:      EKG 8/18/22: My Personal interpretation reveals SB/junctional 58, LBBB    EKG 8/20/22: My Personal interpretation reveals SR 98 LBBB    Echo 8/19/22  Severely reduced left ventricular systolic function.  Global hypokinesis  Reduced right ventricular systolic function.  Severe concentric left ventricular hypertrophy.  Severe aortic valve stenosis - LF-LG type  Estimated right ventricular systolic pressure is 60 mmHg.  Moderate tricuspid regurgitation.    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 fib with LBBB and pauses  -Patient experienced 4.3-second pause yesterday, per RN was awake sitting in chair and asymptomatic, last night had very long 17s pause, with CODE BLUE initiated, 2 compressions given  -Will have temporary transvenous wire placed today during left heart cath procedure  -Spoke with EP, Dr. 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  Specific risks mentioned to the patient including bleeding, cardiac perforation with possible tamponade possibly requiring pericardiocentesis or open heart surgery.  In addition the possibility of lead dislodgment (2-3%), pneumothorax (3%), hemothorax, infection were discussed.  Also, mentioned were the risk of death, stroke, and myocardial infarction.  The patient verbalized understanding of the potential complications and wishes to proceed with the procedure.    Thank you for allowing me to participate in this patients care.  Please contact me with any questions or concerns.  Family at bedside able to discuss plan of care with patient,  all questions answered to the best of my knowledge.     Please see Dr. Martínez's attestation for additions and further recommendations.    I personally spent a total of 14 minutes which includes face-to-face time and non-face-to-face time spent on preparing to see the patient, reviewing hospital notes and tests, obtaining history from the patient, performing a medically appropriate exam, counseling and educating the patient, ordering medications/tests/procedures/referrals as clinically indicated, and documenting information in the electronic medical record.      PLEASE NOTE: Some of this dictation was created using voice recognition software. I have made every reasonable attempt to correct obvious errors, but I expect that there are errors of grammar and possibly content that I did not discover before finalizing the note.     [email or web address]   [facility]  [phone]

## 2022-08-21 NOTE — DISCHARGE PLANNING
Received Choice form at 1156  Agency/Facility Name: [facility]   Referral sent per Choice form @ 1207     1257  DPA faxed via [employer] to [facility] to disregard hospice referral per RN CM.     1536  DPA re faxed via [employer] hospice referral to [facility] per RN CM.

## 2022-08-21 NOTE — PROGRESS NOTES
Bedside report received and patient care assumed. Pt is resting in bed, A&O4, with no complaints of pain, and is on 2.5 L NC, sats WNL. Tele box on. Patient in 1st degree av block with rates in the 80s-100s. VSS. All fall precautions are in place, belongings at bedside table.  Family at bedside, Pt was updated on POC, patient to be NPO at midnight for R heart cath that has been postponed, no questions or concerns. Pt educated on use of call light for assistance.     At approx 145 monitor room called monitor check on patient. CNA entered room and patient alert and talking. Patient then went unresponsive. This Rn then entered room. Patient unresponsive, no pulse. Code blue called, CPR initiated, total of 2-3 compressions given by this RN. Patient became responsive. Patient placed on zoll monitor. Noted to be bradycardic in the 30s, as patient became more responsive HR came up. Called monitor tech and notified that patient had 17 second arrest. SBPs in 150s. See flowsheets. Code team at bedside. Patient transferred to [facility]. Report given to VIELKA Reyes. Patients daughter Madhuri updated by Charge VIELKA Santiago.

## 2022-08-21 NOTE — CARE PLAN
The patient is Unstable - High likelihood or risk of patient condition declining or worsening    Shift Goals  Clinical Goals: maintain SR  Patient Goals: family at bedside  Family Goals: use incentive spirometer, heart cath, mobility    Progress made toward(s) clinical / shift goals:    Problem: Knowledge Deficit - Standard  Goal: Patient and family/care givers will demonstrate understanding of plan of care, disease process/condition, diagnostic tests and medications  Outcome: Progressing  Note: POC discussed with the patient and family. All questions and concerns addressed and answered. Patient is involved in POC and verbalizes the understanding of the disease process, medications, tests and treatments. Questions on POC encouraged throughout care.       Problem: Skin Integrity  Goal: Skin integrity is maintained or improved  Outcome: Progressing  Note: Skin is assessed for integrity throughout the shift. Pt is encouraged to reposition and is turned at least every 2 hours. Pt is kept dry and clean.         Patient is not progressing towards the following goals:

## 2022-08-21 NOTE — PROGRESS NOTES
1537: Entered the room to remove telemetry leads to implement the CC measures. Patient is conversing, denies pain. While stickers are being removed, patient loses consciousness in bed, rolls his eyes, pulses are not detected by this RN. Patient remains pulseless for 25-30 seconds. Patient then regains consciousness and pulses, some confusion initially, verbal, proceeds to tell the story pre-arrest.   Family at bedside for the entire event. Family questions the event is related to this RN removing the stickers off the chest. Reassured the family the two events are not likely related. Provided emotional support to this RN ability.  Patient lost control of bowels during the arrest.   Patient is cleaned up and seems to be comfortable. Family requests to keep O2 at 2 L at this time.

## 2022-08-21 NOTE — ASSESSMENT & PLAN NOTE
Asystole overnight, required CPR achieving ROSC  Monitored in ICU    Comfort care, hospice pending

## 2022-08-21 NOTE — CODE DOCUMENTATION
Monitor Check after 17 second arrest. Upon arrival of rapid team patient has a pulse and SBP of >150

## 2022-08-21 NOTE — PROCEDURES
CPR NOTE:    CODE BLUE was called for asystole, and CPR was initiated.  However after 2 chest compressions, no medications given,  patient began to wake up, heart rate was in 30s, called for atropine however by time available patient's heart rate had improved to the 80s, and was hemodynamically stable.   Telemetry review 17-second of asystole, had around 9 second (pause/asystole) to minutes prior.  With loss of consciousness.  Patient awoke was fully aware no deficits, answering questions.  Blood pressure heart rate and oxygen remained stable.     Upgraded to ICU for further management, see consult note for further details    DAVID Rojas MD  Critical Care Medicine

## 2022-08-21 NOTE — ASSESSMENT & PLAN NOTE
Acute respiratory insufficiency  Titrate supplemental O2 to maintain O2 saturation > 92%  Aggressive pulmonary hygiene  Encourage incentive spirometry  Gentle diuresis

## 2022-08-21 NOTE — ASSESSMENT & PLAN NOTE
17 seconds of asystole cardiac arrest, 2 chest compressions and then patient woke up, no meds given  Initially bradycardic however improved to heart rate in the 80s without medical intervention  Several 3 to 4 second pauses during admission  Initially presented with syncopal episode and loss of consciousness due to prolonged pauses (home beta-blocker and Ca-channel blockers stopped)  HR increased during the evening and cardiology APRN re-ordered beta-blocker and then patient had this event  EKG with left bundle branch block very similar to prior no signs of STEMI.  Chest x-ray does show some increased pulmonary edema, and patient is requiring 2.5 L oxygen.   Cardiology planning PPM and left heart cath for evaluation of TAVR    Avoid kaylin blocking agents  Discuss w/ cardiology, Dr. Martínez, who advices against cardiac cath given high risks  Neuro intact

## 2022-08-21 NOTE — DISCHARGE PLANNING
Case Management Discharge Planning    Admission Date: 8/18/2022  GMLOS: 2.3  ALOS: 3    6-Clicks ADL Score: 19  6-Clicks Mobility Score: 20      Anticipated Discharge Dispo:      DME Needed: No    Action(s) Taken: Choice obtained and Referral(s) sent    Escalations Completed: None    Medically Clear: Yes    Next Steps: RN CM notified by bedside RN that patient would like to go home on hospice.  Family choice is for [facility].  RN CM sent choice to [facility].  Pending acceptance to hospice, RN CM will assist with any other discharge needs (transportation, etc.) to assist in getting patient home as soon as possible. Will follow for acceptance.     Barriers to Discharge: Outpatient referrals pending    Is the patient up for discharge tomorrow: No    1246:  Notified by Dr. Martínez to cancel hospice request.  Updated DPA.       1515:  Notified by Dr. Martínez that family and patient have decided to proceed with comfort care and would like to go home on hospice.  Requested DPA to resend the referral to [facility].  Plan for home tomorrow with hospice once accepted and transportation arranged.

## 2022-08-21 NOTE — PROGRESS NOTES
"Critical Care Progress Note    Date of admission  8/18/2022    Chief Complaint  Reason for Consultation  Cardiac arrest     \"89 y.o. male who presented 8/18/2022 with past medical history of hypertension and dyslipidemia, who presented to ED 8/18, after syncopal event with facial trauma due to fall, extensive work-up was done CT H&P for full ED work-up.  Patient had trope of 119 , proBNP of 8000 , EKG with baseline left bundle branch block in sinus rhythm.  On echo was found to have severe aortic stenosis, EF of 35%, and RVSP of 60 on echo.  Has had several episodes of 3 to 4-second pauses throughout hospitalization.  Plan for cardiac cath for evaluation for possible TAVR.  With several pauses and EF 30% plan for biventricular AICD placement also noted.      At around 1:48 patient had around 8 to 9-second pause/asystole event, then it 1:51 patient went asystolic for about 17 seconds, CODE BLUE was called received 2 chest compressions and then patient woke up, as I arrived to bedside, order for atropine to be drawn, patient was awake answering questions denied chest pain shortness of breath, dizziness lightheadedness, said his only complaint is there is too many people in his room.  Heart rate had improved on its own before atropine given into the 80s, patient's blood pressure was in the 150s, satting 97% on 2.5 L.      Patient was ANO x4 answering questions appropriately, discussed with him what had happened, and also reaffirmed CODE STATUS.  Discussed that with his aortic stenosis and biventricular heart failure may have poor prognosis in event of cardiac arrest.  Patient shows understanding but said he would want to give it a try, that he knows I do my best.  Patient full code.  Called patient's daughter at this time however did not answer message left describing what it happened and asking for a return call for further update.         Chart review shows the patient received metoprolol XL 50 at 8 PM.  Notably " "cardiology note specifically mention to avoid beta-blockade and patient with aortic stenosis and several long pauses.  However it appears patient's heart rate increased to 90s to low 100s in the evening, and patient's home long-acting metoprolol was resumed.  This was not an overdose so glucagon would not be recommended.      Upgrade to ICU for close monitoring of cardiac status, and recurrent pauses, or bradycardia.\"    Hospital Course  8/21 - giselle arrest after receiving beta-blocker, transfer to ICU, long discussion with daughters and decision to not get cardiac cath based on risks, considering hospice    Review of Systems  Review of Systems   Constitutional:  Negative for malaise/fatigue.   Respiratory:  Negative for shortness of breath.    Cardiovascular:  Negative for chest pain.   Gastrointestinal:  Negative for abdominal pain.   Neurological:  Negative for dizziness.   Endo/Heme/Allergies:  Bruises/bleeds easily.   All other systems reviewed and are negative.     Vital Signs for last 24 hours   Temp:  [36.2 °C (97.2 °F)-37.1 °C (98.7 °F)] 36.2 °C (97.2 °F)  Pulse:  [] 89  Resp:  [16-29] 23  BP: (120-163)/(70-92) 163/83  SpO2:  [95 %-100 %] 96 %    Hemodynamic parameters for last 24 hours       Respiratory Information for the last 24 hours       Physical Exam   Physical Exam  Vitals and nursing note reviewed. Exam conducted with a chaperone present.   Constitutional:       Appearance: He is ill-appearing.   HENT:      Head: Normocephalic.      Comments: Extensive bruising to face and head from falls     Right Ear: External ear normal.      Left Ear: External ear normal.      Nose: Nose normal.      Mouth/Throat:      Mouth: Mucous membranes are moist.      Pharynx: Oropharynx is clear.   Eyes:      Extraocular Movements: Extraocular movements intact.      Pupils: Pupils are equal, round, and reactive to light.   Cardiovascular:      Rate and Rhythm: Normal rate and regular rhythm.      Pulses: Normal " pulses.      Heart sounds: Murmur heard.   Pulmonary:      Effort: Pulmonary effort is normal.      Breath sounds: Normal breath sounds.   Abdominal:      Palpations: Abdomen is soft.   Musculoskeletal:         General: Normal range of motion.      Cervical back: Normal range of motion and neck supple.      Right lower leg: Edema present.      Left lower leg: Edema present.   Skin:     General: Skin is warm and dry.      Capillary Refill: Capillary refill takes less than 2 seconds.   Neurological:      General: No focal deficit present.      Mental Status: He is alert. He is confused.      Comments: Pleasantly confused   Psychiatric:         Mood and Affect: Mood normal.       Medications  Current Facility-Administered Medications   Medication Dose Route Frequency Provider Last Rate Last Admin    [START ON 8/22/2022] aspirin (ASA) chewable tab 81 mg  81 mg Oral DAILY August Martínez M.D.        [START ON 8/22/2022] omeprazole (PRILOSEC) capsule 20 mg  20 mg Oral DAILY August Martínez M.D.        [START ON 8/22/2022] timolol (TIMOPTIC) 0.5 % ophthalmic solution 1 Drop  1 Drop Both Eyes DAILY August Martínez M.D.        [START ON 8/22/2022] levothyroxine (SYNTHROID) tablet 50 mcg  50 mcg Oral AM ES August Martínez M.D.        simvastatin (ZOCOR) tablet 40 mg  40 mg Oral Q EVENING August Martínez M.D.        enoxaparin (Lovenox) inj 40 mg  40 mg Subcutaneous DAILY AT 1800 August Martínez M.D.        fish oil capsule 1,000 mg  1,000 mg Oral TID WITH MEALS August Martínez M.D.        vitamin D3 (cholecalciferol) tablet 1,000 Units  1,000 Units Oral DAILY August Martínez M.D.        latanoprost (XALATAN) 0.005 % ophthalmic solution 1 Drop  1 Drop Left Eye Q EVENING Yannick Meier M.D.   1 Drop at 08/20/22 1819    senna-docusate (PERICOLACE or SENOKOT S) 8.6-50 MG per tablet 2 Tablet  2 Tablet Oral BID Domingo Thurman M.D.   2 Tablet at 08/21/22 0534    And    polyethylene glycol/lytes (MIRALAX) PACKET 1 Packet  1 Packet Oral QDAY PRN  Domingo Thurman M.D.        And    magnesium hydroxide (MILK OF MAGNESIA) suspension 30 mL  30 mL Oral QDAY PRN Domingo Thurman M.D.        And    bisacodyl (DULCOLAX) suppository 10 mg  10 mg Rectal QDAY PRN Domingo Thurman M.D.        acetaminophen (Tylenol) tablet 650 mg  650 mg Oral Q6HRS PRN Domingo Thurman M.D.           Fluids    Intake/Output Summary (Last 24 hours) at 8/21/2022 1327  Last data filed at 8/21/2022 1100  Gross per 24 hour   Intake 4055 ml   Output 340 ml   Net 3715 ml       Laboratory          Recent Labs     08/19/22 0117 08/20/22 0640 08/21/22 0212 08/21/22  0407   SODIUM 136 136  --  136   POTASSIUM 4.0 4.2  --  4.4   CHLORIDE 100 100  --  101   CO2 22 21  --  22   BUN 24* 28*  --  30*   CREATININE 1.06 0.94  --  0.92   MAGNESIUM  --   --  2.0  --    PHOSPHORUS  --   --  4.1  --    CALCIUM 9.0 9.0  --  8.6     Recent Labs     08/19/22 0117 08/20/22 0640 08/21/22  0407   ALTSGPT  --   --  21   ASTSGOT  --   --  28   ALKPHOSPHAT  --   --  58   TBILIRUBIN  --   --  0.7   GLUCOSE 113* 112* 119*     Recent Labs     08/19/22 0117 08/20/22 0640 08/21/22  0407   WBC 5.6 9.3 7.9   NEUTSPOLYS  --   --  80.90*   LYMPHOCYTES  --   --  11.40*   MONOCYTES  --   --  7.10   EOSINOPHILS  --   --  0.00   BASOPHILS  --   --  0.10   ASTSGOT  --   --  28   ALTSGPT  --   --  21   ALKPHOSPHAT  --   --  58   TBILIRUBIN  --   --  0.7     Recent Labs     08/19/22 0117 08/20/22 0640 08/21/22  0407   RBC 3.17* 3.34* 3.27*   HEMOGLOBIN 10.4* 11.3* 10.9*   HEMATOCRIT 31.6* 33.7* 33.9*   PLATELETCT 130* 135* 123*       Imaging  X-Ray:  I have personally reviewed the images and compared with prior images.  EKG:  I have personally reviewed the images and compared with prior images.    Assessment/Plan  * Syncope, abnormal EKG/seizure like activity- (present on admission)  Assessment & Plan  EEG negative    Counseling regarding end of life decision making  Assessment & Plan  I had extensive discussion with  the patient and his daughters, Madhuri and Francia, about his current condition and plan of care.  The significant risks of cardiac catheterization and the relatively small chance of benefit were discussed and they made the decision to decline cardiac catheterization and to instead opt for hospice as Francisco would be more comfortable at home.  They made a choice of [facility] (used by their mother).  After we were implementing this process, Madhuri had a change of heart and was not ready for hospice and instead wanted to continue with care.    Hypothyroid- (present on admission)  Assessment & Plan  Continue home levothyroxine    Severe aortic stenosis- (present on admission)  Assessment & Plan  Severe aortic stenosis  Considering cath for work-up for possible TAVR  Gentle diuresis    Pulmonary HTN (HCC)- (present on admission)  Assessment & Plan  Very likely secondary to left heart failure with aortic stenosis  RVSP 60, with a right systolic failure, and tricuspid regurg    Hypoxemic respiratory failure, chronic (HCC)- (present on admission)  Assessment & Plan  Acute respiratory insufficiency  Titrate supplemental O2 to maintain O2 saturation > 92%  Aggressive pulmonary hygiene  Encourage incentive spirometry  Gentle diuresis    Cardiac arrest (HCC)  Assessment & Plan  17 seconds of asystole cardiac arrest, 2 chest compressions and then patient woke up, no meds given  Initially bradycardic however improved to heart rate in the 80s without medical intervention  Several 3 to 4 second pauses during admission  Initially presented with syncopal episode and loss of consciousness due to prolonged pauses (home beta-blocker and Ca-channel blockers stopped)  HR increased during the evening and cardiology APRN re-ordered beta-blocker and then patient had this event  EKG with left bundle branch block very similar to prior no signs of STEMI.  Chest x-ray does show some increased pulmonary edema, and patient is requiring  2.5 L oxygen.   Cardiology planning PPM and left heart cath for evaluation of TAVR    Avoid kaylin blocking agents  Discuss w/ cardiology, Dr. Martínez, who advices against cardiac cath given high risks  Neuro intact    Left bundle branch block- (present on admission)  Assessment & Plan  Baseline no significant changes in EKG    Syncope and collapse- (present on admission)  Assessment & Plan  Likely due to inadequate perfusion 2/2 severe aortic stenosis +/- arrhythmia    Primary hypertension  Assessment & Plan  Continue home ARB  Avoid aggressive anti-HTN therapy    Dyslipidemia- (present on admission)  Assessment & Plan  Continue statin    CAD (coronary artery disease)- (present on admission)  Assessment & Plan  JOYCELYN to LAD in 2004  CHF EF 30%  Continue aspirin, statin, ARB  No beta-blocker 2/2 long pauses and giselle arrest     DVT prophylaxis: Enoxaparin  PUD prophylaxis: Omeprazole  Glycemic control: Sliding scale insulin  Nutrition: NPO --> diet  Lines: None  Mercado: Need for strict I/O monitoring, remove when able  Mobility: Early mobility as tolerated  Goals of care: Full code --> DNAR/DNI  Disposition: ICU --> hospice --> telemetry      I have performed a physical exam and reviewed and updated ROS and Plan today (8/21/2022). In review of yesterday's note (8/20/2022), there are no changes except as documented above.     Discussed patient condition and risk of morbidity and/or mortality with Hospitalist, Family, RN, RT, Pharmacy, Code status disscussed, Charge nurse / hot rounds, Patient, and cardiology    The patient remains critically ill.  Critical care time = 55 minutes in directly providing and coordinating critical care and extensive data review.  No time overlap and excludes procedures.

## 2022-08-21 NOTE — ASSESSMENT & PLAN NOTE
Very likely secondary to left heart failure with aortic stenosis  RVSP 60, with a right systolic failure, and tricuspid regurg

## 2022-08-21 NOTE — PROGRESS NOTES
4 Eyes Skin Assessment Completed by VIELKA Reyes and VIELKA Hutchinson.    Head Bruising, Redness, and Scar  Ears Redness and Blanching  Nose Bruising  Mouth WDL  Neck Discoloration and Bruising  Breast/Chest Bruising, Discoloration, and Scab  Shoulder Blades WDL  Spine WDL  (R) Arm/Elbow/Hand Redness, Bruising, Swelling, and Discoloration  (L) Arm/Elbow/Hand Redness, Bruising, Abrasion, and Swelling  Abdomen WDL  Groin WDL  Scrotum/Coccyx/Buttocks Redness and Blanching  (R) Leg WDL  (L) Leg WDL  (R) Heel/Foot/Toe Edema  (L) Heel/Foot/Toe Edema          Devices In Places ECG, Blood Pressure Cuff, Pulse Ox, Mercado, SCD's, and Oxy Mask      Interventions In Place Waffle Overlay, Pillows, Q2 Turns, and Low Air Loss Mattress    Possible Skin Injury Yes    Pictures Uploaded Into Epic Yes  Wound Consult Placed Yes  RN Wound Prevention Protocol Ordered Yes

## 2022-08-21 NOTE — ASSESSMENT & PLAN NOTE
JOYCELYN to LAD in 2004  CHF EF 30%  Continue aspirin, statin, ARB  No beta-blocker 2/2 long pauses and giselle arrest

## 2022-08-21 NOTE — ASSESSMENT & PLAN NOTE
I had extensive discussion with the patient and his daughters, Madhuri and Francia, about his current condition and plan of care.  The significant risks of cardiac catheterization and the relatively small chance of benefit were discussed and they made the decision to decline cardiac catheterization and to instead opt for hospice as Francisco would be more comfortable at home.  They made a choice of [facility] (used by their mother).  After we were implementing this process, Madhuri had a change of heart and was not ready for hospice and instead wanted to continue with care.

## 2022-08-21 NOTE — CONSULTS
Critical Care Consultation    Date of consult: 8/21/2022    Referring Physician  Candi Rojas M.D.    Reason for Consultation  Cardiac arrest.     History of Presenting Illness  89 y.o. male who presented 8/18/2022 with past medical history of hypertension and dyslipidemia, who presented to ED 8/18, after syncopal event with facial trauma due to fall, extensive work-up was done CT H&P for full ED work-up.  Patient had trope of 119 , proBNP of 8000 , EKG with baseline left bundle branch block in sinus rhythm.  On echo was found to have severe aortic stenosis, EF of 35%, and RVSP of 60 on echo.  Has had several episodes of 3 to 4-second pauses throughout hospitalization.  Plan for cardiac cath for evaluation for possible TAVR.  With several pauses and EF 30% plan for biventricular AICD placement also noted.     At around 1:48 patient had around 8 to 9-second pause/asystole event, then it 1:51 patient went asystolic for about 17 seconds, CODE BLUE was called received 2 chest compressions and then patient woke up, as I arrived to bedside, order for atropine to be drawn, patient was awake answering questions denied chest pain shortness of breath, dizziness lightheadedness, said his only complaint is there is too many people in his room.  Heart rate had improved on its own before atropine given into the 80s, patient's blood pressure was in the 150s, satting 97% on 2.5 L.     Patient was ANO x4 answering questions appropriately, discussed with him what had happened, and also reaffirmed CODE STATUS.  Discussed that with his aortic stenosis and biventricular heart failure may have poor prognosis in event of cardiac arrest.  Patient shows understanding but said he would want to give it a try, that he knows I do my best.  Patient full code.  Called patient's daughter at this time however did not answer message left describing what it happened and asking for a return call for further update.       Chart review shows the  patient received metoprolol XL 50 at 8 PM.  Notably cardiology note specifically mention to avoid beta-blockade and patient with aortic stenosis and several long pauses.  However it appears patient's heart rate increased to 90s to low 100s in the evening, and patient's home long-acting metoprolol was resumed.  This was not an overdose so glucagon would not be recommended.     Upgrade to ICU for close monitoring of cardiac status, and recurrent pauses, or bradycardia.     Message sent to Dr. Arias of cardiology updating on patient's status.  Plan for interventricular pacer to be placed when patient goes to cardiac cath tomorrow, or sooner if recurrent symptomatic pauses or bradycardia.     Code Status  Full Code    Review of Systems  ROS patient denies chest pain shortness of breath, dizziness, lightheadedness abdominal pain.  Blurred vision    Past Medical History   has a past medical history of Left bundle branch block (8/18/2022).  Hypertension dyslipidemia aortic stenosis CHF EF 30%, pulmonary hypertension, GERD, BPH, skin cancer, history of MI (per chart review from hospitalization in 2019)    Surgical History  No known past surgical history    Family History  Noncontributory    Social History   reports that he has never smoked. He has never used smokeless tobacco. He reports that he does not drink alcohol and does not use drugs.    Medications  Home Medications       Reviewed by Damián Casanova (Pharmacy Tech) on 08/18/22 at 1126  Med List Status: Complete     Medication Last Dose Status   aspirin (ASA) 81 MG CHEW UNK Active   atorvastatin (LIPITOR) 40 MG Tab UNK Active   DILTIAZem CD (CARDIZEM CD) 120 MG CAPSULE SR 24 HR UNK Active   Docusate Calcium (STOOL SOFTENER PO) UNK Active   doxazosin (CARDURA) 2 MG Tab UNK Active   Ferrous Sulfate Dried (HIGH POTENCY IRON) 65 MG Tab UNK Active   furosemide (LASIX) 20 MG Tab UNK Active   Krill Oil 300 MG Cap UNK Active   latanoprost (XALATAN) 0.005 % Solution UNK  Active   levothyroxine (SYNTHROID) 50 MCG Tab UNK Active   lisinopril (PRINIVIL) 40 MG tablet UNK Active   metoprolol SR (TOPROL XL) 50 MG TB24 UNK Active   Multiple Vitamins-Minerals (CENTRUM SILVER ADULT 50+) Tab UNK Active   Multiple Vitamins-Minerals (PRESERVISION AREDS) Tab UNK Active   omeprazole (PRILOSEC) 20 MG CPDR UNK Active   potassium chloride SA (KDUR) 20 MEQ Tab CR UNK Active   timolol (TIMOPTIC) 0.5 % SOLN UNK Active                  Current Facility-Administered Medications   Medication Dose Route Frequency Provider Last Rate Last Admin    furosemide (LASIX) injection 10 mg  10 mg Intravenous Q DAY Candi Rojas M.D.        atropine injection 0.4 mg  0.4 mg Intravenous Once PRN Candi Rojas M.D.        enalaprilat (Vasotec) injection 1.25 mg 1 mL  1.25 mg Intravenous Q6HRS PRN Candi Rojas M.D.        levothyroxine (SYNTHROID) tablet 50 mcg  50 mcg Oral AM ES Candi Rojas M.D.        latanoprost (XALATAN) 0.005 % ophthalmic solution 1 Drop  1 Drop Left Eye Q EVENING Yannick Meier M.D.   1 Drop at 08/20/22 1819    timolol (TIMOPTIC) 0.5 % ophthalmic solution 1 Drop  1 Drop Left Eye Q EVENING Yannick Meier M.D.   1 Drop at 08/20/22 1819    losartan (COZAAR) tablet 25 mg  25 mg Oral Q DAY Candi Wong A.P.R.NVioletta   25 mg at 08/20/22 0500    aspirin (ASA) chewable tab 81 mg  81 mg Oral DAILY Domingo Thurman M.D.   81 mg at 08/20/22 0500    fish oil capsule 1,000 mg  1,000 mg Oral TID WITH MEALS Domingo Thurman M.D.   1,000 mg at 08/20/22 1619    omeprazole (PRILOSEC) capsule 20 mg  20 mg Oral DAILY Domingo Thurman M.D.   20 mg at 08/20/22 0459    simvastatin (ZOCOR) tablet 40 mg  40 mg Oral Nightly Domingo Thurman M.D.   40 mg at 08/20/22 2019    vitamin D3 (cholecalciferol) tablet 1,000 Units  1,000 Units Oral BID Domingo Thurman M.D.   1,000 Units at 08/20/22 1619    senna-docusate (PERICOLACE or SENOKOT S) 8.6-50 MG per tablet 2 Tablet  2 Tablet Oral BID Domingo HAWKINS  ML Thurman   2 Tablet at 08/20/22 1619    And    polyethylene glycol/lytes (MIRALAX) PACKET 1 Packet  1 Packet Oral QDAY PRN Domingo Thurman M.D.        And    magnesium hydroxide (MILK OF MAGNESIA) suspension 30 mL  30 mL Oral QDAY PRN Domingo Thurman M.D.        And    bisacodyl (DULCOLAX) suppository 10 mg  10 mg Rectal QDAY PRN Domingo Thurman M.D.        acetaminophen (Tylenol) tablet 650 mg  650 mg Oral Q6HRS PRN Domingo Thurman M.D.        enoxaparin (Lovenox) inj 40 mg  40 mg Subcutaneous DAILY AT 1800 Domingo Thurman M.D.           Allergies  No Known Allergies    Vital Signs last 24 hours  Temp:  [36.2 °C (97.2 °F)-37.1 °C (98.7 °F)] 36.3 °C (97.4 °F)  Pulse:  [] 82  Resp:  [16-20] 20  BP: (141-156)/(82-91) 141/83  SpO2:  [95 %-100 %] 97 %    Physical Exam  Physical Exam  Vitals and nursing note reviewed.   Constitutional:       Appearance: Normal appearance.   Eyes:      Comments: Periorbital ecchymosis of left eye, barely able to open eye   Cardiovascular:      Rate and Rhythm: Regular rhythm. Bradycardia present.      Heart sounds: No murmur heard.     Comments: Bruising on chest, initially bradycardic improved to normal rate  Pulmonary:      Effort: Pulmonary effort is normal. No respiratory distress.      Breath sounds: Normal breath sounds.   Musculoskeletal:      Right lower leg: No edema.      Left lower leg: No edema.      Comments: Bruising of left arm   Neurological:      General: No focal deficit present.      Mental Status: He is alert and oriented to person, place, and time.      Cranial Nerves: No cranial nerve deficit.      Sensory: No sensory deficit.      Motor: No weakness.      Comments: Patient fully awake oriented having full conversations, appropriate   Psychiatric:         Mood and Affect: Mood normal.         Behavior: Behavior normal.       Fluids    Intake/Output Summary (Last 24 hours) at 8/21/2022 0510  Last data filed at 8/20/2022 2300  Gross per 24 hour    Intake 120 ml   Output --   Net 120 ml       Laboratory  Recent Results (from the past 48 hour(s))   EC-ECHOCARDIOGRAM COMPLETE W/O CONT    Collection Time: 22  4:42 PM   Result Value Ref Range    Eject.Frac. MOD BP 51.74     Eject.Frac. MOD 4C 41.13     Eject.Frac. MOD 2C 57.28     Left Ventrical Ejection Fraction 30    CBC WITHOUT DIFFERENTIAL    Collection Time: 22  6:40 AM   Result Value Ref Range    WBC 9.3 4.8 - 10.8 K/uL    RBC 3.34 (L) 4.70 - 6.10 M/uL    Hemoglobin 11.3 (L) 14.0 - 18.0 g/dL    Hematocrit 33.7 (L) 42.0 - 52.0 %    .9 (H) 81.4 - 97.8 fL    MCH 33.8 (H) 27.0 - 33.0 pg    MCHC 33.5 (L) 33.7 - 35.3 g/dL    RDW 50.6 (H) 35.9 - 50.0 fL    Platelet Count 135 (L) 164 - 446 K/uL    MPV 11.2 9.0 - 12.9 fL   Basic Metabolic Panel    Collection Time: 22  6:40 AM   Result Value Ref Range    Sodium 136 135 - 145 mmol/L    Potassium 4.2 3.6 - 5.5 mmol/L    Chloride 100 96 - 112 mmol/L    Co2 21 20 - 33 mmol/L    Glucose 112 (H) 65 - 99 mg/dL    Bun 28 (H) 8 - 22 mg/dL    Creatinine 0.94 0.50 - 1.40 mg/dL    Calcium 9.0 8.5 - 10.5 mg/dL    Anion Gap 15.0 7.0 - 16.0   ESTIMATED GFR    Collection Time: 22  6:40 AM   Result Value Ref Range    GFR (CKD-EPI) 76 >60 mL/min/1.73 m 2   EKG    Collection Time: 22  3:17 PM   Result Value Ref Range    Report       [facility]    Test Date:  2022  Pt Name:    HEAVEN MUNIZ                Department: [facility]  MRN:        [id number]                      Room:       [facility]  Gender:     Male                         Technician: [facility]  :        [id number]                   Requested By:ADDISON RING  Order #:    [id number]                    Reading MD:    Measurements  Intervals                                Axis  Rate:       97                           P:          213  WV:         184                          QRS:        -50  QRSD:       158                          T:          119  QT:         420  QTc:        534    Interpretive  Statements  SINUS OR ECTOPIC ATRIAL RHYTHM  VENTRICULAR PREMATURE COMPLEX  LEFT BUNDLE BRANCH BLOCK  INFERIOR Q WAVES, POSSIBLY DUE TO LBBB  Compared to ECG 2022 07:00:04  Ectopic atrial rhythm now present  Ventricular premature complex(es) now present  Left bundle-branch block now present  Inferior Q waves now present  Q waves now present  Junctional rhythm no lo nger present  Intraventricular conduction delay no longer present  Left ventricular hypertrophy no longer present  Early repolarization no longer present  Myocardial infarct finding no longer present     POCT glucose device results    Collection Time: 22  1:55 AM   Result Value Ref Range    POC Glucose, Blood 119 (H) 65 - 99 mg/dL   EKG    Collection Time: 22  2:09 AM   Result Value Ref Range    Report       [facility]    Test Date:  2022  Pt Name:    HEAVEN MUNIZ                Department: [facility]  MRN:        [id number]                      Room:       [facility]  Gender:     Male                         Technician: 6TXM  :        [id number]                   Requested By:ZAN MERCHANT  Order #:    [id number]                    Reading MD:    Measurements  Intervals                                Axis  Rate:       85                           P:          0  CA:         174                          QRS:        -49  QRSD:       172                          T:          121  QT:         460  QTc:        547    Interpretive Statements  SINUS RHYTHM  MULTIPLE VENTRICULAR PREMATURE COMPLEXES  LEFT BUNDLE BRANCH BLOCK  Compared to ECG 2022 15:17:29  Ectopic atrial rhythm no longer present  Inferior Q waves no longer present  Q waves no longer present     proBrain Natriuretic Peptide, NT    Collection Time: 22  2:12 AM   Result Value Ref Range    NT-proBNP >69149 (H) 0 - 125 pg/mL   MAGNESIUM    Collection Time: 22  2:12 AM   Result Value Ref Range    Magnesium 2.0 1.5 - 2.5 mg/dL   PHOSPHORUS    Collection Time: 22   2:12 AM   Result Value Ref Range    Phosphorus 4.1 2.5 - 4.5 mg/dL   CBC WITH DIFFERENTIAL    Collection Time: 08/21/22  4:07 AM   Result Value Ref Range    WBC 7.9 4.8 - 10.8 K/uL    RBC 3.27 (L) 4.70 - 6.10 M/uL    Hemoglobin 10.9 (L) 14.0 - 18.0 g/dL    Hematocrit 33.9 (L) 42.0 - 52.0 %    .7 (H) 81.4 - 97.8 fL    MCH 33.3 (H) 27.0 - 33.0 pg    MCHC 32.2 (L) 33.7 - 35.3 g/dL    RDW 51.3 (H) 35.9 - 50.0 fL    Platelet Count 123 (L) 164 - 446 K/uL    MPV 11.0 9.0 - 12.9 fL    Neutrophils-Polys 80.90 (H) 44.00 - 72.00 %    Lymphocytes 11.40 (L) 22.00 - 41.00 %    Monocytes 7.10 0.00 - 13.40 %    Eosinophils 0.00 0.00 - 6.90 %    Basophils 0.10 0.00 - 1.80 %    Immature Granulocytes 0.50 0.00 - 0.90 %    Nucleated RBC 0.00 /100 WBC    Neutrophils (Absolute) 6.41 1.82 - 7.42 K/uL    Lymphs (Absolute) 0.90 (L) 1.00 - 4.80 K/uL    Monos (Absolute) 0.56 0.00 - 0.85 K/uL    Eos (Absolute) 0.00 0.00 - 0.51 K/uL    Baso (Absolute) 0.01 0.00 - 0.12 K/uL    Immature Granulocytes (abs) 0.04 0.00 - 0.11 K/uL    NRBC (Absolute) 0.00 K/uL   Comp Metabolic Panel    Collection Time: 08/21/22  4:07 AM   Result Value Ref Range    Sodium 136 135 - 145 mmol/L    Potassium 4.4 3.6 - 5.5 mmol/L    Chloride 101 96 - 112 mmol/L    Co2 22 20 - 33 mmol/L    Anion Gap 13.0 7.0 - 16.0    Glucose 119 (H) 65 - 99 mg/dL    Bun 30 (H) 8 - 22 mg/dL    Creatinine 0.92 0.50 - 1.40 mg/dL    Calcium 8.6 8.5 - 10.5 mg/dL    AST(SGOT) 28 12 - 45 U/L    ALT(SGPT) 21 2 - 50 U/L    Alkaline Phosphatase 58 30 - 99 U/L    Total Bilirubin 0.7 0.1 - 1.5 mg/dL    Albumin 3.3 3.2 - 4.9 g/dL    Total Protein 5.9 (L) 6.0 - 8.2 g/dL    Globulin 2.6 1.9 - 3.5 g/dL    A-G Ratio 1.3 g/dL   TROPONIN    Collection Time: 08/21/22  4:07 AM   Result Value Ref Range    Troponin T 250 (H) 6 - 19 ng/L   LACTIC ACID    Collection Time: 08/21/22  4:07 AM   Result Value Ref Range    Lactic Acid 2.0 0.5 - 2.0 mmol/L   PROCALCITONIN    Collection Time: 08/21/22  4:07 AM    Result Value Ref Range    Procalcitonin 0.06 <0.25 ng/mL   ESTIMATED GFR    Collection Time: 08/21/22  4:07 AM   Result Value Ref Range    GFR (CKD-EPI) 78 >60 mL/min/1.73 m 2   LACTIC ACID    Collection Time: 08/21/22  4:30 AM   Result Value Ref Range    Lactic Acid 1.8 0.5 - 2.0 mmol/L       Imaging  DX-CHEST-LIMITED (1 VIEW)   Final Result      1.  Increased moderate right and small-to-moderate left pleural effusions with associated compressive atelectasis and/or consolidation.   2.  Patchy bilateral interstitial opacities.      EC-ECHOCARDIOGRAM COMPLETE W/O CONT   Final Result      MR-BRAIN-W/O   Final Result         Age-related volume loss and chronic microvascular ischemic changes.      No acute process.      Left frontal supraorbital scalp hematoma noted.      DX-FOREARM LEFT   Final Result      1.  No evidence of acute fracture or dislocation.   2.  Degenerative changes.   3.  Mild soft tissue swelling.   4.  Atherosclerotic plaque.      DX-CHEST-PORTABLE (1 VIEW)   Final Result      1.  Small right and trace left pleural effusion with overlying atelectasis/consolidation.   2.  Cardiomegaly.   3.  Atherosclerotic plaque.      CT-CTA NECK WITH & W/O-POST PROCESSING   Final Result         1.  Severe stenosis of the proximal left internal carotid artery   2.  Small layering bilateral pleural effusions   3.  Mild mediastinal adenopathy, workup and evaluation for causes of adenopathy recommended as clinically appropriate.         CT-CTA HEAD WITH & W/O-POST PROCESS   Final Result         1.  No large vessel occlusion or aneurysm identified.      CT-MAXILLOFACIAL W/O PLUS RECONS   Final Result         1.  No acute traumatic facial bony injuries identified.      CT-CSPINE WITHOUT PLUS RECONS   Final Result         1.  Multilevel degenerative changes of the cervical spine limit diagnostic sensitivity of this examination, otherwise no acute traumatic bony injury of the cervical spine is apparent.   2.  Small  layering bilateral pleural effusions      CL-LEFT HEART CATHETERIZATION WITH POSSIBLE INTERVENTION    (Results Pending)   US-EXTREMITY VENOUS LOWER BILAT    (Results Pending)       Assessment/Plan  Hypothyroid  Assessment & Plan  Patient noted to have hypothyroidism on chart, on levothyroxine 50, was not reordered on admission unclear why.   Order TSH and cortisol  Resume home levothyroxine    Severe aortic stenosis  Assessment & Plan  On echo patient with severe aortic stenosis.   Plan for cath for work-up for possible TAVR, as symptomatic with presentation of syncopal episode with loss of consciousness and facial fractures.   -Severe aortic stenosis likely cause of patient's heart failure with EF of 30%, and very likely cause of right heart failure and pulmonary hypertension.  Avoiding medications that can cause decreased eye on a trophy as needed to help heart against severe stenosis, medications that may increase heart rate as higher heart rate may decrease amount of cardiac output able to go through severely stenosed valve,   Very gentle diuresis as is preload dependent, however does have signs of increasing pulmonary edema and BNP has increased significantly.  10 of Lasix     Pulmonary HTN (HCC)  Assessment & Plan  Very likely secondary to left heart failure with aortic stenosis,   RVSP noted to be 60, with a right systolic failure, and tricuspid regurg.  D-dimer was elevated at 1.7, no significant tachycardia tachypnea lower extremity swelling to indicate DVT or PE  will obtain lower extremity Dopplers to rule out DVT, if worsening signs of right heart failure could consider CTA however at this point seems much less likely cause of patient's pulmonary hypertension    Hypoxemic respiratory failure, chronic (HCC)  Assessment & Plan  Patient initially on room air oxygen decreased into the 80s on 2.5 L  Chest x-ray does show some pulmonary edema  With pulmonary hypertension, and severe aortic stenosis patient is  preload dependent, so very gentle diuresis initiated with 10 of Lasix.  Would not aggressively diurese, try and keep net -500 cc daily as more aggressive diuresis may lead to decompensation.     Cardiac arrest (HCC)  Assessment & Plan  Patient had 17 seconds of asystole cardiac arrest, 2 chest compressions and then patient woke up,  no meds given  Patient fully awake communicative no complaints after this episode.  Initially bradycardic however improved to heart rate in the 80s without medical intervention.   Had had several 3 to 4-second pauses over the last 2 days,    Initially presented with syncopal episode and loss of consciousness, possibly due to prolonged pauses, had been taking metoprolol XL and dilt as outpatient.   -EKG with left bundle branch block very similar to prior no signs of STEMI.  -Chest x-ray does show some increased pulmonary edema, and patient is requiring 2.5 L oxygen.   Cardiology note had mentioned to avoid beta-blockade, however it seems patient's heart rate increased to  in the evening, and home metoprolol was reordered and given at 8 PM.   Dr. Arias cardiology aware, patient plan for cath in a.m. intraventricular pacer will be placed at that time, will perform procedure sooner if recurrence of symptomatic pauses.       -One-time as needed atropine ordered so will be easily available if recurrence, RN not to give until calling MD, should inform MD if becoming bradycardic.   -Keep pacer pads on patient, with ZOLL close to patient's room  -DC beta-blocker-should not have further kaylin blockers.   -Cath with plan for placement of intraventricular pacer in a.m., sooner if recurrence    -Follow-up on labs to make sure that electrolytes stable.  -In chart noted to have hypothyroidism, will obtain thyroid and cortisol levels, along with Trope BNP CMP CBC lactic.      Left bundle branch block  Assessment & Plan  Baseline no significant changes in EKG    Syncope and collapse- (present on  admission)  Assessment & Plan  And it was syncope, significant work-up has been done.  Very likely due to aortic stenosis and multiple prolonged pauses, including 17-second asystole with cardiac arrest.     Primary hypertension  Assessment & Plan  Continue  Losartan.   Patient does have heart failure of 30%, however this is likely due to severe aortic stenosis, decreasing afterload and patient with severe aortic stenosis can lead to significant hypotension, so will not maame for lower MAP as usual for heart failure.   SBP less than 160 at this time  - as needed enalapril (avoiding beta-blocker/calcium channel blocker due to decreased inotrope he and recent asystole with multiple pauses, avoiding hydralazine as can cause reflex tachycardia which can worsen hemodynamics inpatient with aortic stenosis)    Dyslipidemia  Assessment & Plan  Continue statin    CAD (coronary artery disease)- (present on admission)  Assessment & Plan  JOYCELYN to LAD in 2004.   CHF EF 30%, plan for cardiac cath in a.m., and work-up for possible TAVR.   Continue aspirin and statin, losartan  Holding beta-blocker agents has significant pauses and cardiac arrest.    Syncope, abnormal EKG/seizure like activity  Assessment & Plan  MRI brain with no acute pathology   VEEG negative  Pending PT/OT     Left bundle branch block  Assessment & Plan  Pending [facility] on 8/20     Stenosis of right carotid artery  Assessment & Plan  MRI pending  Outpatient follow up     Primary hypertension  Assessment & Plan      Vitals:     08/18/22 1000   BP: 137/65   Pulse: 62   Resp: 20   Temp:     SpO2: 93%      Stable  Hold antihypertensives to eval syncope w/u     Dyslipidemia  Assessment & Plan  Statin     CAD (coronary artery disease)- (present on admission)  Assessment & Plan  ASA, statin       Discussed patient condition and risk of morbidity and/or mortality with Hospitalist, RN, RT, Pharmacy, Code status disscussed, Patient, and cardiology.    The patient remains  critically ill.  Critical care time = 70  minutes in directly providing and coordinating critical care and extensive data review.  No time overlap and excludes procedures.

## 2022-08-21 NOTE — PROGRESS NOTES
Hospital Medicine Daily Progress Note    Date of Service  8/21/2022    Chief Complaint  Francisco Stein is a 89 y.o. male admitted 8/18/2022 with GLF    Hospital Course  89-year-old male with past medical history of hypertension, CAD s/p PCI, DL D presenting after ground-level fall with some seizure-like activity.  Telemetry and EMS had revealed possible heart block.  CT maxillofacial and C-spine showed no evidence of injury.  CTA of head and neck showed proximal left ICA stenosis.  Patient did have a left hand laceration which was sutured by ERP.  EEG did not show any seizure-like activity.  MRI brain showed left scalp hematoma.     He was noted to have a troponin of 119 and cardiology was consulted and patient planned to have left heart cath. C postponed due to urgent case availability. Patient had episode of asystole overnight with code BLUE activation, he had 2 chest compressions and achieved ROSC. He was then transferred to the ICU for close monitoring with plan for pacemaker. Echocardiogram showing EF 30%, severe aortic stenosis. Cardiology consulted who recommend hospice for patient, given poor overall prognosis and risks of pacemaker, left heart cath, need for future TAVR to address underlying heart conditions. Patient and family accepting of hospice, he is transitioned to comfort care/DNR status. Home hospice pending.    Interval Problem Update  Overnight patient with 17 second asystole, code blue called, required 2 chest compressions to achieve ROSC.   He was transferred to ICU with plan for pacemaker placement, left heart cath.  Cardiology discussed risks and benefits of procedures with patient and family and recommend hospice given high risk procedures and overall poor prognosis.  Discussed home hospice with family, all questions answered. Question of whether patient can undergo only pacemaker raised, discussed with cardiology and family that risks of procedure are still very high and would not change  patient's long term prognosis. Therefore recommend hospice at this time which patient and family are comfortable with.  Transition to comfort care, DNR/DNI. Patient and family understand rationale behind DNR status and are accepting of patient passing away naturally.  Hospice referral placed. CM arranging home hospice.      I have discussed this patient's plan of care and discharge plan at IDT rounds today with Case Management, Nursing, Nursing leadership, and other members of the IDT team.    Consultants/Specialty  cardiology  Critical care    Code Status  Comfort Care/DNR    Disposition  Patient is not medically cleared for discharge.   Anticipate discharge to home with hospice.  I have placed the appropriate orders for post-discharge needs.    Review of Systems  Review of Systems   Constitutional:  Negative for chills and fever.   Respiratory:  Negative for cough and shortness of breath.    Cardiovascular:  Negative for chest pain and palpitations.   Gastrointestinal:  Negative for abdominal pain, nausea and vomiting.   Genitourinary:  Negative for dysuria and urgency.   Musculoskeletal:  Positive for falls.   Neurological:  Negative for dizziness and headaches.   All other systems reviewed and are negative.     Physical Exam  Temp:  [36 °C (96.8 °F)-37.1 °C (98.7 °F)] 36 °C (96.8 °F)  Pulse:  [] 81  Resp:  [16-29] 22  BP: (120-163)/(70-92) 144/70  SpO2:  [93 %-100 %] 93 %    Physical Exam  Vitals and nursing note reviewed.   Constitutional:       Appearance: Normal appearance.   Eyes:      Comments: Periorbital ecchymosis of left eye, barely able to open eye   Cardiovascular:      Rate and Rhythm: Normal rate and regular rhythm.      Heart sounds: No murmur heard.     Comments: Bruising on chest  Pulmonary:      Effort: Pulmonary effort is normal. No respiratory distress.      Breath sounds: Normal breath sounds.   Musculoskeletal:      Comments: Bruising of left arm   Neurological:      General: No focal  deficit present.      Mental Status: He is alert and oriented to person, place, and time.       Fluids    Intake/Output Summary (Last 24 hours) at 8/21/2022 1518  Last data filed at 8/21/2022 1400  Gross per 24 hour   Intake 4175 ml   Output 440 ml   Net 3735 ml         Laboratory  Recent Labs     08/19/22  0117 08/20/22  0640 08/21/22  0407   WBC 5.6 9.3 7.9   RBC 3.17* 3.34* 3.27*   HEMOGLOBIN 10.4* 11.3* 10.9*   HEMATOCRIT 31.6* 33.7* 33.9*   MCV 99.7* 100.9* 103.7*   MCH 32.8 33.8* 33.3*   MCHC 32.9* 33.5* 32.2*   RDW 49.5 50.6* 51.3*   PLATELETCT 130* 135* 123*   MPV 11.2 11.2 11.0     Recent Labs     08/19/22  0117 08/20/22  0640 08/21/22  0407   SODIUM 136 136 136   POTASSIUM 4.0 4.2 4.4   CHLORIDE 100 100 101   CO2 22 21 22   GLUCOSE 113* 112* 119*   BUN 24* 28* 30*   CREATININE 1.06 0.94 0.92   CALCIUM 9.0 9.0 8.6                   Imaging  DX-CHEST-LIMITED (1 VIEW)   Final Result      1.  Increased moderate right and small-to-moderate left pleural effusions with associated compressive atelectasis and/or consolidation.   2.  Patchy bilateral interstitial opacities.      EC-ECHOCARDIOGRAM COMPLETE W/O CONT   Final Result      MR-BRAIN-W/O   Final Result         Age-related volume loss and chronic microvascular ischemic changes.      No acute process.      Left frontal supraorbital scalp hematoma noted.      DX-FOREARM LEFT   Final Result      1.  No evidence of acute fracture or dislocation.   2.  Degenerative changes.   3.  Mild soft tissue swelling.   4.  Atherosclerotic plaque.      DX-CHEST-PORTABLE (1 VIEW)   Final Result      1.  Small right and trace left pleural effusion with overlying atelectasis/consolidation.   2.  Cardiomegaly.   3.  Atherosclerotic plaque.      CT-CTA NECK WITH & W/O-POST PROCESSING   Final Result         1.  Severe stenosis of the proximal left internal carotid artery   2.  Small layering bilateral pleural effusions   3.  Mild mediastinal adenopathy, workup and evaluation for  causes of adenopathy recommended as clinically appropriate.         CT-CTA HEAD WITH & W/O-POST PROCESS   Final Result         1.  No large vessel occlusion or aneurysm identified.      CT-MAXILLOFACIAL W/O PLUS RECONS   Final Result         1.  No acute traumatic facial bony injuries identified.      CT-CSPINE WITHOUT PLUS RECONS   Final Result         1.  Multilevel degenerative changes of the cervical spine limit diagnostic sensitivity of this examination, otherwise no acute traumatic bony injury of the cervical spine is apparent.   2.  Small layering bilateral pleural effusions      CL-LEFT HEART CATHETERIZATION WITH POSSIBLE INTERVENTION    (Results Pending)        Assessment/Plan  * Syncope, abnormal EKG/seizure like activity- (present on admission)  Assessment & Plan  MRI brain with no acute pathology   VEEG negative  hospice    Severe aortic stenosis- (present on admission)  Assessment & Plan  Seen on echo, HF EF 30%  Cardiology recommend hospice  Comfort care    Cardiac arrest (HCC)  Assessment & Plan  Asystole overnight, required CPR achieving ROSC  Monitored in ICU    Comfort care, hospice pending    Left bundle branch block- (present on admission)  Assessment & Plan  Cardiology discussed risks and benefits of procedures, recommend hospice  Comfort care    Stenosis of right carotid artery- (present on admission)  Assessment & Plan  Comfort care    Primary hypertension  Assessment & Plan  Hospice pending    Dyslipidemia- (present on admission)  Assessment & Plan  hospice    CAD (coronary artery disease)- (present on admission)  Assessment & Plan  stop meds  Home hospice pending       VTE prophylaxis: enoxaparin ppx    I have performed a physical exam and reviewed and updated ROS and Plan today (8/21/2022). In review of yesterday's note (8/20/2022), there are no changes except as documented above.

## 2022-08-21 NOTE — PALLIATIVE CARE
Palliative Care follow-up  Consult received and [facility] reviewed [facility] Currently no ACP documents on file. [facility] to the [facility] for any [facility]POLST documents.       Plan: Formal consult to follow.    Thank you for allowing Palliative Care to support this patient and family. Contact [phone] for additional assistance, change in patient status, or with any questions/concerns.

## 2022-08-22 ENCOUNTER — APPOINTMENT (OUTPATIENT)
Dept: CARDIOLOGY | Facility: MEDICAL CENTER | Age: 87
DRG: 987 | End: 2022-08-22
Attending: INTERNAL MEDICINE
Payer: MEDICARE

## 2022-08-22 VITALS
DIASTOLIC BLOOD PRESSURE: 71 MMHG | SYSTOLIC BLOOD PRESSURE: 117 MMHG | HEIGHT: 67 IN | RESPIRATION RATE: 23 BRPM | WEIGHT: 183 LBS | TEMPERATURE: 97.1 F | BODY MASS INDEX: 28.72 KG/M2 | HEART RATE: 95 BPM | OXYGEN SATURATION: 98 %

## 2022-08-22 PROCEDURE — 99239 HOSP IP/OBS DSCHRG MGMT >30: CPT | Mod: GW | Performed by: HOSPITALIST

## 2022-08-22 ASSESSMENT — PAIN DESCRIPTION - PAIN TYPE
TYPE: ACUTE PAIN

## 2022-08-22 NOTE — DIETARY
Nutrition Services: Update    Pt transitioning to comfort care measures.    RD available PRN.     Calcipotriene Counseling:  I discussed with the patient the risks of calcipotriene including but not limited to erythema, scaling, itching, and irritation.

## 2022-08-22 NOTE — DISCHARGE PLANNING
Agency/Facility Name: [facility]   Spoke To: Светлана  Outcome: Referral has not been opened yet at the time DPA called. Светлана stated she would send it to the clinical team now and call DPA back once reviewed.     @[id number]  Agency/Facility Name: [facility]   Spoke To: Светлаан  Outcome: Pt has been accepted. Informed Светлана that pt could possibly discharge today, but DPA will call later with confirmation and time.

## 2022-08-22 NOTE — DISCHARGE PLANNING
DC Transport Scheduled    Received request at: 1220    Transport Company Scheduled:  [employer]  Spoke with Mary at [facility] to schedule transport.      Scheduled Date: 08/22/2022  Scheduled Time: 1630    Dest[address]     Notified care team of scheduled transport via [employer].     If there are any changes needed to the DC transportation scheduled, please contact [facility] at ext. [phone] between the hours of 3255-7036 Mon-Fri. If outside those hours, contact the ED Case Manager at ext. [phone].   3

## 2022-08-22 NOTE — DISCHARGE PLANNING
Case Management Discharge Planning    Admission Date: 8/18/2022  GMLOS: 2.3  ALOS: 4    Anticipated Discharge Dispo:  Discharging home with hospice    DME Needed: No    Action(s) Taken: Updated Provider/Nurse on Discharge Plan, Transport Arranged , and Family Conference    Met with patient and family at bedside to advise that [facility] has accepted and will be coming to meet with them at 1300 at the hospital. Family agreeable to plan.     Rosa with [facility] requested that transport be set up for 1630, and stated that the on call nurse would meet patient at home to admit to hospice.Faxed request for transport to [facility] requesting gurney transport through [facility] at 1630.     Escalations Completed: Ride Line    Medically Clear: Yes    Next Steps: No further needs identified.    Barriers to Discharge: None    Is the patient up for discharge: Yes    Is transport arranged for discharge disposition: Yes

## 2022-08-22 NOTE — PROGRESS NOTES
Patient remains comfortably situated in bed. Declining pain, states that he is comfortable. Requests TV to be turned on. Sips of water given.

## 2022-08-22 NOTE — PROGRESS NOTES
Patient resting comfortably surrounded by family, including Francia and Madhuri.  No complaints of pain, a&o4.     Bed is in the lowest position, call light nearby. Patient and family educated to utilize call light if they need anything.

## 2022-08-23 NOTE — DISCHARGE SUMMARY
Discharge Summary    CHIEF COMPLAINT ON ADMISSION  Chief Complaint   Patient presents with    GLF    Head Injury    ALOC       Reason for Admission  TBI     Admission Date  8/18/2022    CODE STATUS  Prior    HPI & HOSPITAL COURSE  This is a 89 y.o. male here with a previous medical history that includes hypertension, coronary artery disease, dyslipidemia.  Patient presented for evaluation of ground-level fall, and altered mentation.  Family noted that the patient had been having increasing exertional dyspnea, and on the morning of presentation, he had gone to the bathroom to shave.  The daughter heard a thud, and went to the bathroom to find him on the ground having hit his head.  He was initially unconscious but shortly after that woke up and seemed confused.  Shortly after that he had what appeared to be seizure-like activity per the daughter, and she was instructed by EMS to initiate CPR.  When EMS arrived, they found the patient yelling, and placed on the monitor.  Here he was noted to be in possibly complete heart block.  He was then brought to the emergency room for evaluation.    Patient had extensive work-up.  Stat imaging of the head and neck and left arm were negative for any acute injury.  Patient did have a head laceration which was repaired.  His EKG demonstrated a junctional rhythm, labs were relatively benign with exception of a BNP of 8000.  Patient was admitted to the telemetry floor with cardiology consultation.    Transthoracic sick echo eventually demonstrated critical aortic stenosis.  He was also noted to have an ejection fraction of 35%, and elevated right sided pressures.  While on telemetry he had several up to 4-second pauses, and then on August 21, he had a 17-second pause.  CPR was initiated however he had only a few compressions before awakening.  He was then transferred to the ICU.  Here the patient was evaluated by the TAVR team as well as cardiothoracic surgery.  After evaluation it  was decided that due to the patient's advanced age and frailty, he was too high risk for intervention.  After multiple discussions with the family, the decision was made to transition to comfort care and the patient was discharged on hospice care.      Therefore, he is discharged in guarded and stable condition to hospice.    The patient met 2-midnight criteria for an inpatient stay at the time of discharge.    Discharge Date  8/22/2022    FOLLOW UP ITEMS POST DISCHARGE  With the hospice team    DISCHARGE DIAGNOSES  Principal Problem:    Syncope, abnormal EKG/seizure like activity POA: Yes  Active Problems:    CAD (coronary artery disease) POA: Yes      Overview: H/o cypher JOYCELYN TO LAD 2/04    Dyslipidemia POA: Yes    Primary hypertension POA: Unknown    Stenosis of right carotid artery POA: Yes    Syncope and collapse POA: Yes    Left bundle branch block POA: Yes    Cardiac arrest (HCC) POA: Unknown    Hypoxemic respiratory failure, chronic (HCC) POA: Yes    Pulmonary HTN (HCC) POA: Yes    Severe aortic stenosis POA: Yes    Hypothyroid POA: Yes    Counseling regarding end of life decision making POA: Unknown  Resolved Problems:    * No resolved hospital problems. *      FOLLOW UP  No future appointments.  Cintia Hearn M.D.  [id number]  [facility] [address] [phone]            MEDICATIONS ON DISCHARGE     Medication List        CONTINUE taking these medications        Instructions   * Centrum Silver Adult 50+ Tabs   Take 1 Tablet by mouth every morning.  Dose: 1 Tablet     * PreserVision AREDS Tabs   Take 2 Tablets by mouth every day.  Dose: 2 Tablet     DILTIAZem  MG Cp24  Commonly known as: CARDIZEM CD   Take 120 mg by mouth every morning.  Dose: 120 mg     doxazosin 2 MG Tabs  Commonly known as: CARDURA   Take 2-4 mg by mouth 2 times a day. 2 mg in the morning & 4 mg in the evening  Dose: 2-4 mg     furosemide 20 MG Tabs  Commonly known as: LASIX   Take 20 mg by mouth every  morning.  Dose: 20 mg     latanoprost 0.005 % Soln  Commonly known as: XALATAN   Administer 1 Drop into the left eye every evening.  Dose: 1 Drop     levothyroxine 50 MCG Tabs  Commonly known as: SYNTHROID   Take 50 mcg by mouth every morning on an empty stomach.  Dose: 50 mcg     metoprolol SR 50 MG Tb24  Commonly known as: TOPROL XL   Take 50 mg by mouth every morning.  Dose: 50 mg     omeprazole 20 MG delayed-release capsule  Commonly known as: PRILOSEC   Take 1 Cap by mouth every day.  Dose: 20 mg     STOOL SOFTENER PO   Take 2 Capsules by mouth every day.  Dose: 2 Capsule     timolol 0.5 % Soln  Commonly known as: TIMOPTIC   Administer 1 Drop into the left eye 2 times a day.  Dose: 1 Drop           * This list has 2 medication(s) that are the same as other medications prescribed for you. Read the directions carefully, and ask your doctor or other care provider to review them with you.                STOP taking these medications      aspirin 81 MG Chew chewable tablet  Commonly known as: ASA     atorvastatin 40 MG Tabs  Commonly known as: LIPITOR     High Potency Iron 65 MG Tabs     Krill Oil 300 MG Caps     lisinopril 40 MG tablet  Commonly known as: PRINIVIL     potassium chloride SA 20 MEQ Tbcr  Commonly known as: Kdur              Allergies  No Known Allergies    DIET  No orders of the defined types were placed in this encounter.      ACTIVITY  As tolerated.  Weight bearing as tolerated    CONSULTATIONS  Cardiology    PROCEDURES  None    LABORATORY  Lab Results   Component Value Date    SODIUM 136 08/21/2022    POTASSIUM 4.4 08/21/2022    CHLORIDE 101 08/21/2022    CO2 22 08/21/2022    GLUCOSE 119 (H) 08/21/2022    BUN 30 (H) 08/21/2022    CREATININE 0.92 08/21/2022        Lab Results   Component Value Date    WBC 7.9 08/21/2022    HEMOGLOBIN 10.9 (L) 08/21/2022    HEMATOCRIT 33.9 (L) 08/21/2022    PLATELETCT 123 (L) 08/21/2022        Total time of the discharge process exceeds 40 minutes.  I spent most of  that time at the bedside with the patient's family reviewing discharge planning and instructions specifically related to hospice, home health support, symptomatic management, prognosis etc.